# Patient Record
Sex: MALE | Race: WHITE | NOT HISPANIC OR LATINO | URBAN - METROPOLITAN AREA
[De-identification: names, ages, dates, MRNs, and addresses within clinical notes are randomized per-mention and may not be internally consistent; named-entity substitution may affect disease eponyms.]

---

## 2017-01-01 ENCOUNTER — OFFICE VISIT - RIVER FALLS (OUTPATIENT)
Dept: FAMILY MEDICINE | Facility: CLINIC | Age: 67
End: 2017-01-01

## 2017-01-01 ENCOUNTER — AMBULATORY - RIVER FALLS (OUTPATIENT)
Dept: FAMILY MEDICINE | Facility: CLINIC | Age: 67
End: 2017-01-01

## 2017-01-01 ENCOUNTER — COMMUNICATION - RIVER FALLS (OUTPATIENT)
Dept: FAMILY MEDICINE | Facility: CLINIC | Age: 67
End: 2017-01-01

## 2017-01-01 LAB
CREAT SERPL-MCNC: 0.94 MG/DL (ref 0.7–1.25)
GLUCOSE BLD-MCNC: 143 MG/DL (ref 65–99)
HBA1C MFR BLD: 7.5 %
HBA1C MFR BLD: 7.5 %

## 2017-01-01 ASSESSMENT — MIFFLIN-ST. JEOR
SCORE: 1516.36
SCORE: 1512.73

## 2017-03-08 ENCOUNTER — OFFICE VISIT - RIVER FALLS (OUTPATIENT)
Dept: FAMILY MEDICINE | Facility: CLINIC | Age: 67
End: 2017-03-08

## 2017-03-14 ENCOUNTER — OFFICE VISIT - RIVER FALLS (OUTPATIENT)
Dept: FAMILY MEDICINE | Facility: CLINIC | Age: 67
End: 2017-03-14

## 2018-01-01 ENCOUNTER — OFFICE VISIT - RIVER FALLS (OUTPATIENT)
Dept: FAMILY MEDICINE | Facility: CLINIC | Age: 68
End: 2018-01-01

## 2018-01-01 ENCOUNTER — AMBULATORY - RIVER FALLS (OUTPATIENT)
Dept: FAMILY MEDICINE | Facility: CLINIC | Age: 68
End: 2018-01-01

## 2018-01-01 LAB
CREAT SERPL-MCNC: 0.9 MG/DL
GLUCOSE BLD-MCNC: 198 MG/DL

## 2018-01-01 ASSESSMENT — MIFFLIN-ST. JEOR: SCORE: 1473.72

## 2022-02-11 VITALS
HEART RATE: 64 BPM | WEIGHT: 172.4 LBS | SYSTOLIC BLOOD PRESSURE: 120 MMHG | SYSTOLIC BLOOD PRESSURE: 150 MMHG | HEART RATE: 70 BPM | OXYGEN SATURATION: 99 % | DIASTOLIC BLOOD PRESSURE: 58 MMHG | BODY MASS INDEX: 27.49 KG/M2 | HEIGHT: 66 IN | TEMPERATURE: 98.3 F | TEMPERATURE: 97.7 F | DIASTOLIC BLOOD PRESSURE: 70 MMHG | BODY MASS INDEX: 27.71 KG/M2 | WEIGHT: 171.6 LBS

## 2022-02-11 VITALS
WEIGHT: 186.2 LBS | SYSTOLIC BLOOD PRESSURE: 128 MMHG | DIASTOLIC BLOOD PRESSURE: 68 MMHG | HEART RATE: 60 BPM | TEMPERATURE: 97.9 F | BODY MASS INDEX: 29.83 KG/M2

## 2022-02-11 VITALS
OXYGEN SATURATION: 98 % | RESPIRATION RATE: 20 BRPM | WEIGHT: 176 LBS | WEIGHT: 181.8 LBS | HEART RATE: 60 BPM | WEIGHT: 181 LBS | DIASTOLIC BLOOD PRESSURE: 60 MMHG | DIASTOLIC BLOOD PRESSURE: 66 MMHG | WEIGHT: 184 LBS | HEIGHT: 66 IN | OXYGEN SATURATION: 99 % | SYSTOLIC BLOOD PRESSURE: 138 MMHG | HEART RATE: 73 BPM | HEART RATE: 80 BPM | TEMPERATURE: 96.2 F | BODY MASS INDEX: 29.47 KG/M2 | HEART RATE: 68 BPM | BODY MASS INDEX: 28.19 KG/M2 | HEIGHT: 66 IN | OXYGEN SATURATION: 98 % | SYSTOLIC BLOOD PRESSURE: 131 MMHG | DIASTOLIC BLOOD PRESSURE: 60 MMHG | TEMPERATURE: 97.4 F | SYSTOLIC BLOOD PRESSURE: 130 MMHG | TEMPERATURE: 97.4 F | DIASTOLIC BLOOD PRESSURE: 75 MMHG | BODY MASS INDEX: 29.09 KG/M2 | BODY MASS INDEX: 29.22 KG/M2 | TEMPERATURE: 97.5 F | SYSTOLIC BLOOD PRESSURE: 112 MMHG

## 2022-02-11 VITALS
WEIGHT: 186 LBS | SYSTOLIC BLOOD PRESSURE: 126 MMHG | HEART RATE: 80 BPM | BODY MASS INDEX: 29.8 KG/M2 | TEMPERATURE: 96.2 F | DIASTOLIC BLOOD PRESSURE: 60 MMHG

## 2022-02-16 NOTE — CARE COORDINATION
Patient:   CHINO LEWIS            MRN: [id number]            FIN: [id number]               Age:   67 years     Sex:  Male     :  1950   Associated Diagnoses:   None   Author:   Gladys Canela CMA      Sources of Information:  [ ] Patient, family member, or caregiver (Please list):  [ X] Hospital discharge summary  [ ] Hospital fax  [ ] List of recent hospitalizations or ED visits  [ ] Other:   Discharged From: [facility]   Discharge Date: 2018  Diagnosis/Problem: Malignant pleuroal effusion   Discharge note reviewed: [ X] Yes [ ]No  Medication Changes: [X ] Yes [ ] No   Medication List Updated: [X ] Yes [ ] No  Needs Referral or Lab: [ ] Yes [X ] No  F/U Appointment Made With: I talked to Chino today.  He was to see Dr. Neal on Friday for follow up visit.  He has CHemo scheduled for Friday.    I will check with Dr. Neal to see if that follow up is needed. He is being followed closely by Dr. Dwyer and his care team at this time.    Patient contacted: Talked to Chino, He is going to  the pain medication but he has not really had much pain since he left the hospital.    Additional Information :   Dr. Neal is fine that Chino does not come in for his follow up after the ED visit.    But he will notifiy is if he see changes happening with his blood surgars and will keep DS in the loop as well.  Note sent to DS so she is aware.

## 2022-02-16 NOTE — PROGRESS NOTES
Patient:   JIMENEZ LEWIS            MRN: [id number]            FIN: [id number]               Age:   66 years     Sex:  Male     :  1950   Associated Diagnoses:   Hypertension; Hyperlipidemia; Diabetes Mellitus, Type 2; Coronary artery disease; AK (actinic keratosis)   Author:   Antonio Neal MD      Visit Information      Date of Service: 2017 08:43 am  Performing Location: [facility]  Encounter#: [id number]      Primary Care Provider (PCP):  Antonio Neal MD    NPI# [id number]      Referring Provider:  Antonio Neal MD    NPI# [id number]      Chief Complaint            Additional Information:No additional information recorded during visit.   Chief complaint and symptoms as noted above and confirmed with patient      History of Present Illness   3/11/2014: Jimenez presents to clinic to [facility] care, previously followed by [facility].  He has a long history of type 2 diabetes without microvascular complications.  He has been maintained on oral hypoglycemics, historically achieving good control.  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  Plans to retire at end of year - basal insulin maybe an option then.  H/o CAD, CABG in  00; no symptoms since.  Says that his gycemic control and weight are generally worse in the winter months - limited activities, increases with warmer weather.   Had been on Medifast, achieved some weight reduction, but couldn't tolerate soy-based products; no longer on diet.  No other voiced complaints.  Shared results of recent labs.    2016: Robbie presents to clinic for regular follow-up.  Since her last visit he started on glimepiride as a preferred option primarily related to cost.  Overall blood sugar control is in good.  A few isolated episodes of hypoglycemia in the evenings.  Typically morning sugars ranging from 100s-120s.  Currently taking Lantus 24 units nightly.  Planning to leave for [place] for a ten-day excursion in the  near future.  He is retired as of this month.    3/14/2017: Presents for general diabetic follow-up.  He switch from Victoza to Tanzeum this past fall.  He increase his Lantus up from 24 units to 28 units.  He is seeing considerable variability in blood sugars in the morning and finds that sugars consistently will rise throughout the day.  No hypoglycemic episodes.    6/14/2017: Check presents to clinic for diabetic follow-up.  At her last visit he was started on Jardiance 10 mg daily.  He has tolerated medication without problems.  No significant changes in weight or blood pressure.  No described hypoglycemia.  Hemoglobin A1c has improved since last visit.         Review of Systems   Constitutional:  No fever, No chills.    Eye:  Negative except as documented in history of present illness.    Ear/Nose/Mouth/Throat:  Negative except as documented in history of present illness.    Respiratory:  No shortness of breath.    Cardiovascular:  No chest pain, No palpitations, No peripheral edema, No syncope.    Gastrointestinal:  No nausea, No vomiting, No abdominal pain.    Genitourinary:  No dysuria, No hematuria.    Hematology/Lymphatics:  Negative except as documented in history of present illness.    Endocrine:  No excessive thirst, No polyuria.    Immunologic:  No recurrent fevers.    Musculoskeletal:  Joint pain, No muscle pain.    Neurologic:  Alert and oriented X4, No numbness, No tingling, No headache.       Health Status   Allergies:    Allergic Reactions (Selected)  No known allergies   Medications:  (Selected)   Prescriptions  Prescribed  BD PEN JOHN UF SHORT 8MM 100's 31G 5/16: BD PEN JOHN UF SHORT 8MM 100's 31G 5/16, See Instructions, Instructions: USE TWICE A DAY, Supply, # 200 EA, 2 Refill(s), Type: Maintenance, Pharmacy: [facility], USE TWICE A DAY  BD PEN NEEDLES UF SHORT 8MM 100'S 31G 5/16: BD PEN NEEDLES UF SHORT 8MM 100'S 31G 5/16, See Instructions, Instructions: USE DAILY, # 100 EA, 3  Refill(s), Type: Maintenance, Pharmacy: [facility]  BD PEN NEEDLES UF SHORT 8MM 100'S 31G 5/16: See Instructions, Instructions: USE DAILY, # 100 unknown unit, 2 Refill(s), Pharmacy: [facility], USE DAILY  FREESTYLE LITE TEST [id number]: See Instructions, Instructions: USE TO TEST FIVE TO SIX TIMES DAILY, # 500 EA, 2 Refill(s), Type: Soft Stop, Pharmacy: [facility], USE TO TEST FIVE TO SIX TIMES DAILY  Freestyle Lite test strips: Freestyle Lite test strips, See Instructions, Instructions: TESTING 5-6x/day, Supply, # 500 EA, 3 Refill(s), Pharmacy: [facility], TESTING 5-6x/day  Freestyle insulinx test strips: Freestyle insulinx test strips, See Instructions, Instructions: testing 2x/ day, Supply, # 200 EA, 3 Refill(s), Type: Maintenance, Pharmacy: [facility], testing 2x/ day  Jardiance 25 mg oral tablet: 1 tab(s) ( 25 mg ), po, qam, # 90 tab(s), 3 Refill(s), Type: Maintenance, Pharmacy: [facility], 1 tab(s) po qam  Lantus Solostar Pen 100 units/mL subcutaneous solution: See Instructions, Instructions: INJECT 34 UNITS UNDER THE SKIN AT BEDTIME, # 45 mL, 3 Refill(s), Type: Maintenance, Pharmacy: [facility], INJECT 34 UNITS UNDER THE SKIN AT BEDTIME  Lipitor 20 mg oral tablet: 1 tab(s) ( 20 mg ), PO, Daily, # 90 tab(s), 3 Refill(s), Type: Maintenance, Pharmacy: [facility], 1 tab(s) po daily  Misc Rx Supply: BD UF short 5/16 pen needles, See Instructions, Instructions: uses BID, Supply, # 200 EA, 3 Refill(s), Type: Maintenance, [facility]acy: [facility], in place of previious rx-keep on hold and pt will notify when needed, uses BID  Tanzeum 30 mg subcutaneous injection: ( 30 mg ), subcutaneous, qweek, # 12 EA, 3 Refill(s), Type: Maintenance, Pharmacy: [facility], 30 mg subcutaneous qweek  Viagra 100 mg oral tablet: 1 tab(s) ( 100 mg ), PO, Daily,  Instructions: KEEP ON HOLD AND PT WILL NOTIFY WHEN NEEDED, # 30 tab(s), 11 Refill(s), Type: Maintenance, Pharmacy: [facility], 1 tab(s) po daily,Instr:KEEP ON HOLD AND PT WILL NOTIFY WHEN NEEDED  aspirin 325 mg oral tablet: 1 tab(s) ( 325 mg ), PO, Daily, # 30 tab(s), 0 Refill(s), Type: Maintenance, OTC (Rx)  atenolol 25 mg oral tablet: 1 tab(s) ( 25 mg ), po, daily, # 90 tab(s), 3 Refill(s), Type: Maintenance, Pharmacy: [facility], 1 tab(s) po daily  hydrochlorothiazide-lisinopril 12.5 mg-10 mg oral tablet: 1 tab(s), PO, Daily, # 90 tab(s), 3 Refill(s), Type: Maintenance, Pharmacy: [facility], 1 tab(s) po daily  metFORMIN 1000 mg oral tablet: 1 tab(s) ( 1,000 mg ), po, bid, # 180 tab(s), 3 Refill(s), Type: Maintenance, Pharmacy: [facility], 1 tab(s) po bid  Documented Medications  Documented  Eucerin topical cream: 1 gurdeep, TOP, BID, 0  Multiple Vitamins oral capsule: 1 cap(s), PO, Daily, 0 Refill(s), Type: Soft Stop   Problem list:    All Problems  Coronary artery disease / ICD-9-.0 / Confirmed  Diabetes Mellitus, Type 2 / ICD-9-.00 / Confirmed  Hyperlipidemia / SNOMED CT 27974090 / Confirmed  Hypertension / SNOMED CT 1386690926 / Confirmed  Impotence / ICD-9-.84 / Confirmed  Obesity / ICD-9-.00 / Probable  Inactive: Diverticulosis / ICD-9-.10  Canceled: Obese / ICD-9-.00      Histories   Past Medical History:    Active  Coronary artery disease (414.0)   Family History:    Melanoma  Sister (Vilma)  Diabetes mellitus  Sister (Vilma)  Hypertension  Sister (Vilma)  Brother (Michael)  Brother (Bruno)  Mother ()  Diabetes mellitus type 2  Mother ()  Father ()  Myocardial infarction  Father ()  Carotid endarterectomy  Mother ()  Bladder cancer  Father ()     Procedure history:    Colonoscopy (990507546) on 2010 at 60 Years.  Comments:  2/15/2011 12:47 PM -  Svitlana Abdalla CMA  Repeat in 10 yrs    12/9/2010 10:23 AM - Juliet Rasmussen  Post-op diagnosis:  diverticulosis of colon.  Dilated retinal eye exam with interpretation by an ophthalmologist or optometrist documented and reviewed (DM) (2022F) on 2/26/2010 at 59 Years.  Flexible sigmoidoscopy (43040965) on 11/11/2007 at 57 Years.  Flexible sigmoidoscopy (15040898) on 11/11/2004 at 54 Years.  CABG - Coronary artery bypass graft ([id number]) on 11/13/2000 at 50 Years.  Comments:  5/14/2010 12:42 PM - Maranda Love  Three Vessel.  Arthroscopy of knee (318343228) in 1993 at 43 Years.  Comments:  5/14/2010 12:41 PM - Maranda Love  Right.  Inguinal herniorrhaphy ([id number]) in 1966 at 16 Years.  Comments:  9/27/2013 9:54 AM - Destiney Bravo  Right  Inguinal herniorrhaphy ([id number]) in 1955 at 5 Years.  Comments:  9/27/2013 9:54 AM - Destiney Bravo  Left   Social History:        Alcohol Assessment: Current            Current, Wine (5 oz), 1-2 times per week      Tobacco Assessment: Past            Past, Stopped age 50 Years.      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        Physical Examination   vital signs stable, as noted above   VS/Measurements   General:  Alert and oriented, No acute distress.    Eye:  Extraocular movements are intact.    HENT:  Normocephalic, Tympanic membranes are clear, Oral mucosa is moist, No pharyngeal erythema, upper dentures.    Neck:  Supple, No jugular venous distention, No lymphadenopathy.    Respiratory:  Lungs are clear to auscultation, Respirations are non-labored.    Cardiovascular:  Normal rate, No murmur, No edema.    Gastrointestinal:  Soft, Non-distended, Normal bowel sounds, No organomegaly.    Musculoskeletal:  Normal range of motion, Normal strength, No tenderness.    Integumentary:  dry, crackles soles of feet - no intertrigo, single  actinic keratosis on right cheek.    Neurologic:  Alert, Oriented, Normal motor function, No focal deficits.    Cognition and Speech:  Oriented, Speech clear and coherent.    Psychiatric:  Appropriate mood & affect.       Review / Management   Results review:  Lab results: 6/7/2017 12:00 AM CDT    Hgb A1c                   7.5  HI  .       Impression and Plan   Diagnosis     Hypertension (VKO54-IR I10).     Hyperlipidemia (GRG46-RV E78.5).     Diabetes Mellitus, Type 2 (KIM53-PJ E11.9).     Coronary artery disease (RKZ86-FK I25.10).     AK (actinic keratosis) (RUF98-HC L57.0).       .) DM, controlled  hypoglycemic medications: metformin 1g BID, tanzeum 30mg qweek, empagliflozin 10mg daily   - increase empagliflozin to 25mg daily  insulin therapy: Lantus 34 units qhs   - other alterative would be introduction of prandial insulin  last HbA1c: 7.5%  microvascular complications: none  macrovascular complications: CAD  ASA/JOSÉ MIGUEL/statin:  ASA 325mg daily, lisinopril 10mg, atorvastatin 20mg QHS    .) HTN, controlled  current antihypertensive regimen: atenolol 25mg daily, lisinopril/hctz 10/12.5mg daily  regimen changes: none  intolerance:  future titration/work-up plan:     .) CAD, h/o CABG in '00   - on atorvastatin, ASA, atenolol    .) Health maintenance   - CRC screening due in 2020   - PSA 0.4;  plans to monitor q2yrs ('17)   - AAA screen normal ('15)    RTC in 6 months         Professional Services   Counseling Summary:  This was a 25 minute visit with greater than 50% of that time spent counseling the patient.

## 2022-02-16 NOTE — PROGRESS NOTES
Patient:   JIMENEZ LEWIS            MRN: [id number]            FIN: [id number]               Age:   66 years     Sex:  Male     :  1950   Associated Diagnoses:   Hypertension; Hyperlipidemia; Diabetes Mellitus, Type 2; Coronary artery disease; AK (actinic keratosis)   Author:   Antonio Neal MD      Visit Information      Date of Service: 2017 08:27 am  Performing Location: [facility]  Encounter#: [id number]      Primary Care Provider (PCP):  Antonio Neal MD    NPI# [id number]      Referring Provider:  No referring provider recorded for selected visit.      Chief Complaint   3/14/2017 8:43 AM CDT    f/u DM - BS's elevated since switching from Victoza elly Tanzeum              Additional Information:No additional information recorded during visit.   Chief complaint and symptoms as noted above and confirmed with patient      History of Present Illness   3/11/2014: Jimenez presents to clinic to establish care, previously followed by [facility].  He has a long history of type 2 diabetes without microvascular complications.  He has been maintained on oral hypoglycemics, historically achieving good control.  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  Plans to retire at end of year - basal insulin maybe an option then.  H/o CAD, CABG in  00; no symptoms since.  Says that his gycemic control and weight are generally worse in the winter months - limited activities, increases with warmer weather.   Had been on Medifast, achieved some weight reduction, but couldn't tolerate soy-based products; no longer on diet.  No other voiced complaints.  Shared results of recent labs.    2016: Presents for regular diabetic follow-up.  No changes in his insulin or other medicines.  Labs reviewed with him today.  Hemoglobin A1c and Accu-Chek blood sugar readings have been ranging higher more recently.  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    2016: Robbie presents to clinic for  regular follow-up.  Since her last visit he started on glimepiride as a preferred option primarily related to cost.  Overall blood sugar control is in good.  A few isolated episodes of hypoglycemia in the evenings.  Typically morning sugars ranging from 100s-120s.  Currently taking Lantus 24 units nightly.  Planning to leave for [place] for a ten-day excursion in the near future.  He is retired as of this month.    3/14/2017: Presents for general diabetic follow-up.  He switch from Victoza to Tanzeum this past fall.  He increase his Lantus up from 24 units to 28 units.  He is seeing considerable variability in blood sugars in the morning and finds that sugars consistently will rise throughout the day.  No hypoglycemic episodes.         Review of Systems   Constitutional:  No fever, No chills.    Eye:  Negative except as documented in history of present illness.    Ear/Nose/Mouth/Throat:  Negative except as documented in history of present illness.    Respiratory:  No shortness of breath.    Cardiovascular:  No chest pain, No palpitations, No peripheral edema, No syncope.    Gastrointestinal:  No nausea, No vomiting, No abdominal pain.    Genitourinary:  No dysuria, No hematuria.    Hematology/Lymphatics:  Negative except as documented in history of present illness.    Endocrine:  No excessive thirst, No polyuria.    Immunologic:  No recurrent fevers.    Musculoskeletal:  Joint pain, No muscle pain.    Neurologic:  Alert and oriented X4, No numbness, No tingling, No headache.       Health Status   Allergies:    Allergic Reactions (Selected)  No known allergies   Medications:  (Selected)   Prescriptions  Prescribed  BD PEN JOHN UF SHORT 8MM 100's 31G 5/16: BD PEN JOHN UF SHORT 8MM 100's 31G 5/16, See Instructions, Instructions: USE TWICE A DAY, Supply, # 200 EA, 2 Refill(s), Type: Maintenance, Pharmacy: [facility], USE TWICE A DAY  BD PEN NEEDLES UF SHORT 8MM 100'S 31G 5/16: BD PEN NEEDLES UF  SHORT 8MM 100'S 31G 5/16, See Instructions, Instructions: USE DAILY, # 100 EA, 3 Refill(s), Type: Maintenance, Pharmacy: [facility]  BD PEN NEEDLES UF SHORT 8MM 100'S 31G 5/16: See Instructions, Instructions: USE DAILY, # 100 unknown unit, 2 Refill(s), Pharmacy: [facility], USE DAILY  FREESTYLE LITE TEST UVFX10W: See Instructions, Instructions: USE TO TEST FIVE TO SIX TIMES DAILY, # 500 EA, 2 Refill(s), Type: Soft Stop, Pharmacy: [email or web address], USE TO TEST FIVE TO SIX TIMES DAILY  Freestyle Lite test strips: Freestyle Lite test strips, See Instructions, Instructions: TESTING 5-6x/day, Supply, # 500 EA, 3 Refill(s), Pharmacy: [facility], TESTING 5-6x/day  Freestyle insulinx test strips: Freestyle insulinx test strips, See Instructions, Instructions: testing 2x/ day, Supply, # 200 EA, 3 Refill(s), Type: Maintenance, [facility]: [facility], testing 2x/ day  Jardiance 10 mg oral tablet: 1 tab(s) ( 10 mg ), po, qam, # 30 tab(s), 3 Refill(s), Type: Maintenance, Pharmacy: [facility], 1 tab(s) po qam  Lantus Solostar Pen 100 units/mL subcutaneous solution: See Instructions, Instructions: INJECT 22 UNITS UNDER THE SKIN AT BEDTIME, # 30 mL, 3 Refill(s), Type: Maintenance, Pharmacy: [facility], INJECT 22 UNITS UNDER THE SKIN AT BEDTIME  Lipitor 20 mg oral tablet: 1 tab(s) ( 20 mg ), PO, Daily, # 90 tab(s), 1 Refill(s), Type: Maintenance, Pharmacy: [facility], 1 tab(s) po daily  Misc Rx Supply: BD UF short 5/16 pen needles, See Instructions, Instructions: uses BID, Supply, # 200 EA, 3 Refill(s), Type: Maintenance, Pharmacy: [facility], in place of previious rx-keep on hold and pt will notify when needed, uses BID  Tanzeum 30 mg subcutaneous injection: ( 30 mg ), subcutaneous, qweek, # 12 EA, 1 Refill(s), Type: Maintenance, Pharmacy: [facility], in place of  Victoza, 30 mg subcutaneous qweek  Viagra 100 mg oral tablet: 1 tab(s) ( 100 mg ), PO, Daily, Instructions: KEEP ON HOLD AND PT WILL NOTIFY WHEN NEEDED, # 30 tab(s), 11 Refill(s), Type: Maintenance, Pharmacy: [facility], 1 tab(s) po daily,Instr:KEEP ON HOLD AND PT WILL NOTIFY WHEN NEEDED  Victoza 18 mg/3 mL subcutaneous solution: See Instructions, Instructions: INJECT 1.8 MG UNDER THE SKIN DAILY, # 27 mL, 1 Refill(s), Type: Maintenance, Pharmacy: [facility], INJECT 1.8 MG UNDER THE SKIN DAILY  aspirin 325 mg oral tablet: 1 tab(s) ( 325 mg ), PO, Daily, # 30 tab(s), 0 Refill(s), Type: Maintenance, OTC (Rx)  atenolol 25 mg oral tablet: 1 tab(s) ( 25 mg ), po, daily, # 90 tab(s), 1 Refill(s), Type: Maintenance, Pharmacy: [facility], 1 tab(s) po daily  glimepiride 2 mg oral tablet: 1 tab(s) ( 2 mg ), po, daily, # 90 tab(s), 1 Refill(s), Type: Maintenance, Pharmacy: [facility], 1 tab(s) po daily  hydrochlorothiazide-lisinopril 12.5 mg-10 mg oral tablet: 1 tab(s), PO, Daily, # 90 tab(s), 3 Refill(s), Type: Maintenance, Pharmacy: [facility], KEEP ON HOLD AND PT WILL NOTIFY WHEN NEEDED, 1 tab(s) po daily  metFORMIN 1000 mg oral tablet: 1 tab(s) ( 1,000 mg ), po, bid, # 180 tab(s), 1 Refill(s), Type: Maintenance, Pharmacy: [facility], 1 tab(s) po bid  Documented Medications  Documented  Eucerin topical cream: 1 gurdeep, TOP, BID, 0  Multiple Vitamins oral capsule: 1 cap(s), PO, Daily, 0 Refill(s), Type: Soft Stop   Problem list:    All Problems  Coronary artery disease / ICD-9-.0 / Confirmed  Diabetes Mellitus, Type 2 / ICD-9-.00 / Confirmed  Hyperlipidemia / SNOMED CT 51530193 / Confirmed  Hypertension / SNOMED CT 7738344748 / Confirmed  Impotence / ICD-9-.84 / Confirmed  Obesity / ICD-9-.00 / Probable  Inactive: Diverticulosis / ICD-9-.10  Canceled: Obese / ICD-9-.00       Histories   Past Medical History:    Active  Coronary artery disease (414.0)   Family History:    Melanoma  Sister (Vilma)  Diabetes mellitus  Sister (Vilma)  Hypertension  Sister (Vilma)  Brother (Michael)  Brother (Bruno)  Mother ()  Diabetes mellitus type 2  Mother ()  Father ()  Myocardial infarction  Father ()  Carotid endarterectomy  Mother ()  Bladder cancer  Father ()     Procedure history:    Colonoscopy (537254298) on 2010 at 60 Years.  Comments:  2/15/2011 12:47 PM - Svitlana Abdalla CMA  Repeat in 10 yrs    2010 10:23 AM - Juliet Rasmussen  Post-op diagnosis:  diverticulosis of colon.  Dilated retinal eye exam with interpretation by an ophthalmologist or optometrist documented and reviewed (DM) () on 2010 at 59 Years.  Flexible sigmoidoscopy (59267839) on 2007 at 57 Years.  Flexible sigmoidoscopy (45330972) on 2004 at 54 Years.  CABG - Coronary artery bypass graft (259653077) on 2000 at 50 Years.  Comments:  2010 12:42 PM - Maranda Love  Three Vessel.  Arthroscopy of knee (354524030) in  at 43 Years.  Comments:  2010 12:41 PM - Maranda Love  Right.  Inguinal herniorrhaphy ([id number]) in 1966 at 16 Years.  Comments:  2013 9:54 AM - Destiney Bravo  Right  Inguinal herniorrhaphy ([id number]) in 1955 at 5 Years.  Comments:  2013 9:54 AM - Destiney Bravo  Left   Social History:        Alcohol Assessment: Current            Current, Wine (5 oz), 1-2 times per week      Tobacco Assessment: Past            Past, Stopped age 50 Years.      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        Physical Examination   vital signs stable, as noted above   Vital Signs   3/14/2017 8:43 AM CDT Temperature Tympanic 97.9 DegF    Peripheral Pulse Rate 60 bpm    Systolic Blood Pressure  128 mmHg    Diastolic Blood Pressure 68 mmHg    Mean Arterial Pressure 88 mmHg    BP Site Right arm      Measurements from flowsheet : Measurements   3/14/2017 8:43 AM CDT    Weight Measured - Standard                186.2 lb     General:  Alert and oriented, No acute distress.    Eye:  Extraocular movements are intact.    HENT:  Normocephalic, Tympanic membranes are clear, Oral mucosa is moist, No pharyngeal erythema, upper dentures.    Neck:  Supple, No jugular venous distention, No lymphadenopathy.    Respiratory:  Lungs are clear to auscultation, Respirations are non-labored.    Cardiovascular:  Normal rate, No murmur, No edema.    Gastrointestinal:  Soft, Non-distended, Normal bowel sounds, No organomegaly.    Musculoskeletal:  Normal range of motion, Normal strength, No tenderness.    Integumentary:  dry, crackles soles of feet - no intertrigo, single actinic keratosis on right cheek.    Neurologic:  Alert, Oriented, Normal motor function, No focal deficits, Normal monofilament testing.    Cognition and Speech:  Oriented, Speech clear and coherent.    Psychiatric:  Appropriate mood & affect.       Review / Management   Results review:  Lab results   3/8/2017 8:16 AM CST Hgb A1c 8.5  HI    Cholesterol 143 mg/dL    Non-    HDL 32 mg/dL  LOW    Chol/HDL Ratio 4.5    LDL 73    Triglyceride 188 mg/dL  HI    U Microalbumin 1.8 mg/dL    Ur Creatinine 192 mg/dL    Ur Microalb/Creat Ratio 9   .       Impression and Plan   Diagnosis     Hypertension (SRG22-GR I10).     Hyperlipidemia (HZB63-VT E78.5).     Diabetes Mellitus, Type 2 (LFC41-EI E11.9).     Coronary artery disease (QHO61-RN I25.10).     AK (actinic keratosis) (GUY34-TE L57.0).       .) DM, uncontrolled  hypoglycemic medications: metformin 1g BID, tanzeum 30mg qweek, glimepiride 2mg daily  insulin therapy: Lantus 28 units qhs   - will try and start on empagliflozin 10mg daily (potentially may stop both glimepiride and hctz if good beneficial effect)     - cautioned about hypuglycemia and hypovolemia risk   - other alterative would be introduction of prandial insulin  last HbA1c: 8.5%  microvascular complications: none  macrovascular complications: CAD  ASA/JOSÉ MIGUEL/statin:  ASA 325mg daily, lisinopril 10mg, atorvastatin 20mg QHS    .) HTN, controlled  current antihypertensive regimen: atenolol 25mg daily, lisinopril/hctz 10/12.5mg daily  regimen changes: none  intolerance:  future titration/work-up plan:     .) CAD, h/o CABG in '00   - on atorvastatin, ASA, atenolol    .) Health maintenance   - CRC screening due in 2020   - PSA 0.4;  plans to monitor q2yrs ('17)   - AAA screen normal ('15)    RTC in 4 months         Professional Services   Counseling Summary:  This was a 25 minute visit with greater than 50% of that time spent counseling the patient.

## 2022-02-16 NOTE — PROGRESS NOTES
Patient:   CHINO LEWIS            MRN: [id number]            FIN: [id number]               Age:   67 years     Sex:  Male     :  1950   Associated Diagnoses:   Hypertension; Hyperlipidemia; Diabetes Mellitus, Type 2; Coronary artery disease; Non-small cell lung cancer (NSCLC)   Author:   Antonio Neal MD      Visit Information      Date of Service: 2018 10:56 am  Performing Location: [facility]  Encounter#: [id number]      Primary Care Provider (PCP):  Antonio Neal MD    NPI# [id number]      Referring Provider:  Antonio Neal MD    NPI# [id number]      Chief Complaint            Additional Information:No additional information recorded during visit.   Chief complaint and symptoms as noted above and confirmed with patient      History of Present Illness   3/11/2014: Chino presents to clinic to [facility] care, previously followed by [facility].  He has a long history of type 2 diabetes without microvascular complications.  He has been maintained on oral hypoglycemics, historically achieving good control.  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  Plans to retire at end of year - basal insulin maybe an option then.  H/o CAD, CABG in  00; no symptoms since.  Says that his gycemic control and weight are generally worse in the winter months - limited activities, increases with warmer weather.   Had been on Medifast, achieved some weight reduction, but couldn't tolerate soy-based products; no longer on diet.  No other voiced complaints.  Shared results of recent labs.    2018: Presents for hospital follow-up.  This past weekend presented to the emergency room with a reported temperature 102.  Admitted to [facility] for suspected severe sepsis.  Started on broad-spectrum antibiotics.  Blood cultures were normal.  Left sided malignant effusion re-tapped; pleural fluid not showing any growth.  Discharged on oral Levaquin, though check did not take due to his concerns  about antibiotic exposure during his hospitalization.  He is scheduled for Pleurx catheter placement this coming Monday.  Generally feels better since hospital discharge.  He says that the thoracentesis seemed to help most.  He is continued on his regular chemotherapy cycle.  Continues to have complications related to hyperglycemia around chemo time.  Sodium level of 122 upon hospital admission which did trend upward.  Repeat level today is 131.         Review of Systems   Constitutional:  No fever, No chills.    Eye:  Negative except as documented in history of present illness.    Ear/Nose/Mouth/Throat:  Negative except as documented in history of present illness.    Respiratory:  Shortness of breath, No cough, No sputum production, No hemoptysis.    Cardiovascular:  No chest pain, No palpitations, No peripheral edema, No syncope.    Gastrointestinal:  No nausea, No vomiting, No abdominal pain.    Genitourinary:  No dysuria, No hematuria.    Hematology/Lymphatics:  Negative except as documented in history of present illness.    Endocrine:  No excessive thirst, No polyuria.    Immunologic:  No recurrent fevers.    Musculoskeletal:  No back pain, No joint pain, No muscle pain.    Neurologic:  Alert and oriented X4, No numbness, No tingling, No headache.       Health Status   Allergies:    Allergic Reactions (Selected)  No Known Medication Allergies   Medications:  (Selected)   Prescriptions  Prescribed  BD PEN NEEDLES UF SHORT 8MM 100'S 31G 5/16: BD PEN NEEDLES UF SHORT 8MM 100'S 31G 5/16, See Instructions, Instructions: USE DAILY, # 100 EA, 3 Refill(s), Type: Maintenance, Pharmacy: [facility]  BD PEN NEEDLES UF SHORT 8MM 100'S 31G 5/16: See Instructions, Instructions: USE DAILY, # 100 unknown unit, 2 Refill(s), [facility]: [facility], USE DAILY  BP Pen Henry UF Short 8mm 100's 31G5/16: BP Pen Henry UF Short 8mm 100's 31G5/16, See Instructions, Instructions: use bid, Supply, # 200 EA, 3 Refill(s),  Type: Maintenance, Pharmacy: [facility], use bid  FREESTYLE LITE TEST [id number]: FREESTYLE LITE TEST RCSR18S, See Instructions, Instructions: USE TO TEST FIVE TO SIX TIMES DAILY, Supply, # 500 EA, 2 Refill(s), Type: Soft Stop, Pharmacy: [id number], USE TO TEST FIVE TO SIX TIMES DAILY  Freestyle Lite test strips: Freestyle Lite test strips, See Instructions, Instructions: TESTING 5-6x/day, Supply, # 500 EA, 3 Refill(s), [facility]: [facility], TESTING 5-6x/day  Freestyle insulinx test strips: Freestyle insulinx test strips, See Instructions, Instructions: testing 2x/ day, Supply, # 200 EA, 3 Refill(s), Type: Maintenance, Pharmacy: [facility], testing 2x/ day  Lantus Solostar Pen 100 units/mL subcutaneous solution: See Instructions, Instructions: INJECT 34 UNITS UNDER THE SKIN AT BEDTIME, # 45 mL, 3 Refill(s), Type: Maintenance, Pharmacy: [facility], INJECT 34 UNITS UNDER THE SKIN AT BEDTIME  Lipitor 20 mg oral tablet: 1 tab(s) ( 20 mg ), PO, Daily, # 90 tab(s), 3 Refill(s), Type: Maintenance, Pharmacy: [facility], 1 tab(s) po daily  Misc Rx Supply: BD UF short 5/16 pen needles, See Instructions, Instructions: uses BID, Supply, # 200 EA, 3 Refill(s), Type: Maintenance, Pharmacy: [facility], in place of previious rx-keep on hold and pt will notify when needed, uses BID  Tanzeum 30 mg subcutaneous injection: ( 30 mg ), subcutaneous, qweek, # 12 EA, 3 Refill(s), Type: Maintenance, Pharmacy: [facility], 30 mg subcutaneous qweek  Viagra 100 mg oral tablet: 1 tab(s) ( 100 mg ), PO, Daily, Instructions: KEEP ON HOLD AND PT WILL NOTIFY WHEN NEEDED, # 30 tab(s), 11 Refill(s), Type: Maintenance, Pharmacy: [facility], 1 tab(s) po daily,Instr:KEEP ON HOLD AND PT WILL NOTIFY WHEN NEEDED  aspirin 325 mg oral tablet: 1 tab(s) ( 325 mg ), PO, Daily, # 30 tab(s), 0 Refill(s),  Type: Maintenance, OTC (Rx)  atenolol 25 mg oral tablet: 1 tab(s) ( 25 mg ), po, daily, # 90 tab(s), 3 Refill(s), Type: Maintenance, Pharmacy: [facility], 1 tab(s) po daily  lisinopril 10 mg oral tablet: 1 tab(s) ( 10 mg ), PO, Daily, # 90 tab(s), 3 Refill(s), Type: Maintenance, Pharmacy: [facility], 1 tab(s) po daily  metFORMIN 1000 mg oral tablet: 1 tab(s) ( 1,000 mg ), po, bid, # 180 tab(s), 3 Refill(s), Type: Maintenance, Pharmacy: [facility], 1 tab(s) po bid  Suspended  Jardiance 25 mg oral tablet: 1 tab(s) ( 25 mg ), po, qam, # 90 tab(s), 3 Refill(s), Type: Maintenance, Pharmacy: [facility], 1 tab(s) po qam  Documented Medications  Documented  Eucerin topical cream: 1 gurdeep, TOP, BID, 0  Flonase 50 mcg/inh nasal spray: 1 spray(s), nasal, daily, 0 Refill(s), Type: Maintenance  LORazepam 0.5 mg oral tablet: 1 tab(s) ( 0.5 mg ), po, hs, 0 Refill(s), Type: Maintenance  Mucinex 600 mg oral tablet, extended release: 1 tab(s) ( 600 mg ), po, q12 hrs, 0 Refill(s), Type: Maintenance  Multiple Vitamins oral capsule: 1 cap(s), PO, Daily, 0 Refill(s), Type: Soft Stop  OLANZapine 2.5 mg oral tablet: See Instructions, Instructions: 1 tab daily for 4dasy starting the day of chemo, 0 Refill(s), Type: Maintenance  folic acid 1 mg oral tablet: 1 tab(s) ( 1 mg ), po, daily, 0 Refill(s), Type: Maintenance  omeprazole 20 mg oral delayed release tablet: 1 tab(s) ( 20 mg ), po, daily, 0 Refill(s), Type: Maintenance  prochlorperazine 10 mg oral tablet: 1 tab(s) ( 10 mg ), po, q4 hrs, PRN: for nausea/vomiting, 0 Refill(s), Type: Maintenance   Problem list:    All Problems  Coronary artery disease / SNOMED CT 97833397 / Confirmed  Hyperlipidemia / SNOMED CT 78025761 / Confirmed  Hypertension / SNOMED CT 9457727813 / Confirmed  Impotence / SNOMED CT 1070313354 / Confirmed  Non-small cell carcinoma of lung / SNOMED CT 369737719 / Confirmed  Obesity / SNOMED CT  4240562629 / Probable  Diabetes mellitus, type 2 / SNOMED CT 842196276 / Confirmed  Inactive: Diverticulosis / SNOMED CT 5400313982  Resolved: Inpatient stay / SNOMED CT 085765257  Canceled: Obese / ICD-9-.00      Histories   Past Medical History:    Active  Diabetes mellitus, type 2 (936945182)  Impotence (0071753677)  Obesity (9186626221)  Coronary artery disease (49700581)  Non-small cell carcinoma of lung (511677952)  Resolved  Inpatient stay (747880921): Onset on 2018 at 67 years.  Resolved on 2018 at 67 years.  Comments:  2018 CST 7:39 AM CST - Dominique Goldman  @[facility] [place] [place] - Malignant pleural effusion   Family History:    Melanoma  Sister (Vilma)  Diabetes mellitus  Sister (Vilma)  Hypertension  Sister (Vilma)  Brother (Michael)  Brother (Bruno)  Mother ()  Diabetes mellitus type 2  Mother ()  Father ()  Myocardial infarction  Father ()  Carotid endarterectomy  Mother ()  Bladder cancer  Father ()     Procedure history:    Thoracentesis (623984931) on 2018 at 67 Years.  Comments:  2018 7:43 AM - Dominique Goldman  1200 ml.   DX:  Malignant pleural effusion.  Adenocarcinoma of right lung.  Diagnostic bronchoscopy (488661464) on 2017 at 67 Years.  Colonoscopy (466482219) on 2010 at 60 Years.  Comments:  2/15/2011 12:47 PM - Svitlana Abdalla CMA  Repeat in 10 yrs    2010 10:23 AM - Juliet Rasmussen  Post-op diagnosis:  diverticulosis of colon.  Dilated retinal eye exam with interpretation by an ophthalmologist or optometrist documented and reviewed (DM) () on 2010 at 59 Years.  Flexible sigmoidoscopy (14363414) on 2007 at 57 Years.  Flexible sigmoidoscopy (84809722) on 2004 at 54 Years.  CABG - Coronary artery bypass graft (301047332) on 2000 at 50 Years.  Comments:  2010 12:42 PM - Maranda Love  Three Vessel.  Arthroscopy of knee (669365165) in  at 43  Years.  Comments:  5/14/2010 12:41 PM - Maranda Love  Right.  Inguinal herniorrhaphy (85470757) in 1966 at 16 Years.  Comments:  9/27/2013 9:54 AM - Destiney Bravo  Right  Inguinal herniorrhaphy (09288909) in 1955 at 5 Years.  Comments:  9/27/2013 9:54 AM - Destiney Bravo  Left   Social History:        Alcohol Assessment            Current, Wine (5 oz), 2-3 times per week      Tobacco Assessment            Past, Stopped age 50 Years.      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        Physical Examination   vital signs stable, as noted above   VS/Measurements   General:  Alert and oriented, No acute distress.    Eye:  Extraocular movements are intact.    HENT:  Normocephalic, Tympanic membranes are clear, upper dentures.    Neck:  Supple.    Respiratory:  Respirations are non-labored, diminished breath sounds on right; dullness to palpation on right.    Cardiovascular:  Normal rate, Regular rhythm, No murmur, No edema, Infusaport in place.    Gastrointestinal:  Soft, Non-distended, Normal bowel sounds, No organomegaly.    Musculoskeletal:  Normal range of motion, Normal strength.    Integumentary:  single actinic keratosis on right cheek.    Neurologic:  Alert, Oriented, Normal motor function, No focal deficits.    Cognition and Speech:  Oriented, Speech clear and coherent.    Psychiatric:  Appropriate mood & affect.       Review / Management   Results review:  Lab results   2/16/2018 10:53 AM CST Sodium Level 131 mmol/L    Potassium Level 4.6 mmol/L    Chloride Level 98 mmol/L    CO2 Level 24 mmol/L    AGAP 9    Glucose Level 244 mg/dL    BUN 22 mg/dL    Creatinine 0.91 mg/dL    BUN/Creat Ratio 24    eGFR  >60    eGFR Non-African American >60    Calcium Level 8.9 mg/dL   2/12/2018 2:14 PM CST Sodium Level  mEq/L    Potassium Level TR 4.2 mEq/L    Chloride  mEq/L    CO2 TR 21 mEq/L    Anion Gap TR 7  mEq/L    Glucose Level  mg/dL    BUN TR 17 mg/dL    Creatinine TR 0.90 mg/dL    BUN/Creatinine Ratio TR 19    Calcium TR 8.9 mEq/dL    WBC TR 5.7 x10^3/uL    RBC TR 3.02 x10^6/uL    Hgb TR 7.6 g/dL    Hct TR 24.5 %    MCV TR 81 fL    MCH TR 25.2 pg    MCHC TR 31.0 gm/dL    RDW TR 20.7 %    Platelet  x10^3/uL    MPV (Mean Platelet Volume) TR 8.6 fL   .       Impression and Plan   Diagnosis     Hypertension (CFA02-JL I10).     Hyperlipidemia (OUA49-UV E78.5).     Diabetes Mellitus, Type 2 (ZEI40-LK E11.9).     Coronary artery disease (XYN72-DT I25.10).     Non-small cell lung cancer (NSCLC) (BID65-NO C34.90).         .) hospital f/u; sepsis, recurrent right pleural effusion  discharge summary reviewed and meds reconciled   - completed 3 day course of pip/tazo and vanco; discharged on levaquin though Robbie opted not to take.  Has felt well since.  At this point, will not resume  - scheduled for Pleur-X catheter placement on 2/19.    .) metastatic poorly differentiated adenocarcinoma of the lung; PDL1 +  - originally seen on CXR (10/31/2017)  - CT chest  (11/7/2017) redemonstrating pleural effusion with passive compressive atelectasis; no other described parenchymal disease  - diagnostic/therapeutic thoracentesis (11/9/2017): grossly hemorrhagic quality; exudative effusion (serum , total protein 6.1); negative cytology    - AFB stain negative; fluid culture negative  - repeat CT of chest (11/22/2017): recurrence of effusion with described 4x5 cm RUL posterior mass/consolidation  - PET demonstrating FDG avid lung mass and separate left femoral bone lesion  - biopsy positive for 50% PDL1 mutation  - started on Keytruda, Alimta, carbplatin + Zometa (completed 3 of planned 6 cycles)   - overall tolerating therapy well    .) DM, controlled  hypoglycemic medications: metformin 1g BID, tanzeum 30mg qweek, empagliflozin 25mg daily (holding)  insulin therapy: Lantus 20 units qhs + sliding scale insulin just  around chemo infusion  last HbA1c: 7.5%  microvascular complications: none  macrovascular complications: CAD  ASA/JOSÉ MIGUEL/statin:  ASA 325mg daily, lisinopril 10mg, atorvastatin 20mg QHS    plan as previously outlined:     .) HTN, controlled  current antihypertensive regimen: atenolol 25mg daily, lisinopril 10mg daily  regimen changes: none  intolerance: thiazide (hyponatremia)  future titration/work-up plan:     .) CAD, h/o CABG in '00   - on atorvastatin, ASA, atenolol    .) Health maintenance   - CRC screening due in 2020   - PSA 0.4;  plans to monitor q2yrs (recheck at next visit)   - AAA screen normal ('15)   - now retired    RTC as previously scheduled         Professional Services   Counseling Summary:  This was a 25 minute visit with greater than 50% of that time spent counseling the patient.

## 2022-02-16 NOTE — PROGRESS NOTES
Patient:   CHINO LEWIS            MRN: [id number]            FIN: [id number]               Age:   67 years     Sex:  Male     :  1950   Associated Diagnoses:   Hypertension; Hyperlipidemia; Diabetes Mellitus, Type 2; Coronary artery disease; AK (actinic keratosis)   Author:   Antonio Neal MD      Visit Information      Date of Service: 2017 11:21 am  Performing Location: [facility]  Encounter#: [id number]      Primary Care Provider (PCP):  Antonio Neal MD    NPI# [id number]      Referring Provider:  Antonio Neal MD    NPI# [id number]      Chief Complaint   2017 11:26 AM CST  f/u CT results and recent cytology results              Additional Information:No additional information recorded during visit.   Chief complaint and symptoms as noted above and confirmed with patient      History of Present Illness   3/11/2014: Chino presents to clinic to establish care, previously followed by [facility].  He has a long history of type 2 diabetes without microvascular complications.  He has been maintained on oral hypoglycemics, historically achieving good control.  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  Plans to retire at end of year - basal insulin maybe an option then.  H/o CAD, CABG in  00; no symptoms since.  Says that his gycemic control and weight are generally worse in the winter months - limited activities, increases with warmer weather.   Had been on Medifast, achieved some weight reduction, but couldn't tolerate soy-based products; no longer on diet.  No other voiced complaints.  Shared results of recent labs.    3/14/2017: Presents for general diabetic follow-up.  He switch from Victoza to Tanzeum this past fall.  He increase his Lantus up from 24 units to 28 units.  He is seeing considerable variability in blood sugars in the morning and finds that sugars consistently will rise throughout the day.  No hypoglycemic episodes.    2017: Check  presents to clinic for diabetic follow-up.  At her last visit he was started on Jardiance 10 mg daily.  He has tolerated medication without problems.  No significant changes in weight or blood pressure.  No described hypoglycemia.  Hemoglobin A1c has improved since last visit.    10/19/2017: Presents for diabetic follow-up.  He did have his Jardiance increased to 25 mg daily.  He has noticed some lower morning sugars in the 70s and 80s.  He has scaled back his Lantus dose from 34 units to 24 units at night.  Otherwise feeling fine.  No other specific complaints or concerns.    10/31/2017: Presents with complaints of midthoracic back pain aggravated after persistent coughing illness.  Pains are within the paraspinal distribution along the right side.  No pleuritic component can take a deep breath without pain.      11/7/2017: Return to clinic for follow-up after visit this past week.  He says he generally feels better from last week.  No back pains.  No shortness of breath.  Still some occasional semi-productive cough.  No positional orthopnea.  No general fluid retention.  No fever chills.      11/28/2017: Robbie returns to clinic after undergoing recent two-week follow-up CT of the previously drained right-sided pleural effusion.  Pleural studies consistent with an exudative effusion.  Grossly having a partial hemorrhagic quality.  Cytology negative.  He has noticed worsening positional dyspnea especially when lying down on the left side.  Describes some cough with blood-tinged component at times.  Denies any fever chills.  Follow-up CT on November 22 shows a general recurrence of his effusion though not to degree of previous imaging.  Now description of a right upper lobe posterior 4 x 4 centimeter consolidation.         Review of Systems   Constitutional:  No fever, No chills.    Eye:  Negative except as documented in history of present illness.    Ear/Nose/Mouth/Throat:  Negative except as documented in history  of present illness.    Respiratory:  Cough, Sputum production, Hemoptysis, No shortness of breath.    Cardiovascular:  No chest pain, No palpitations, No peripheral edema, No syncope.    Gastrointestinal:  No nausea, No vomiting, No abdominal pain.    Genitourinary:  No dysuria, No hematuria.    Hematology/Lymphatics:  Negative except as documented in history of present illness.    Endocrine:  No excessive thirst, No polyuria.    Immunologic:  No recurrent fevers.    Musculoskeletal:  No back pain, No joint pain, No muscle pain.    Neurologic:  Alert and oriented X4, No numbness, No tingling, No headache.       Health Status   Allergies:    Allergic Reactions (Selected)  No Known Medication Allergies   Medications:  (Selected)   Prescriptions  Prescribed  BD PEN NEEDLES UF SHORT 8MM 100'S 31G 5/16: BD PEN NEEDLES UF SHORT 8MM 100'S 31G 5/16, See Instructions, Instructions: USE DAILY, # 100 EA, 3 Refill(s), Type: Maintenance, Pharmacy: [facility]  BD PEN NEEDLES UF SHORT 8MM 100'S 31G 5/16: See Instructions, Instructions: USE DAILY, # 100 unknown unit, 2 Refill(s), Pharmacy: [facility], USE DAILY  BP Pen Henry UF Short 8mm 100's 31G5/16: BP Pen Henry UF Short 8mm 100's 31G5/16, See Instructions, Instructions: use bid, Supply, # 200 EA, 3 Refill(s), Type: Maintenance, Pharmacy: [facility], use bid  FREESTYLE LITE TEST [id number]: FREESTYLE LITE TEST [id number], See Instructions, Instructions: USE TO TEST FIVE TO SIX TIMES DAILY, Supply, # 500 EA, 2 Refill(s), Type: Soft Stop, Pharmacy: [id number], USE TO TEST FIVE TO SIX TIMES DAILY  Freestyle Lite test strips: Freestyle Lite test strips, See Instructions, Instructions: TESTING 5-6x/day, Supply, # 500 EA, 3 Refill(s), Pharmacy: [facility], TESTING 5-6x/day  Freestyle insulinx test strips: Freestyle insulinx test strips, See Instructions, Instructions: testing 2x/ day, Supply, # 200 EA, 3 Refill(s), Type:  Maintenance, Pharmacy: [facility], testing 2x/ day  Jardiance 25 mg oral tablet: 1 tab(s) ( 25 mg ), po, qam, # 90 tab(s), 3 Refill(s), Type: Maintenance, Pharmacy: [facility], 1 tab(s) po qam  Lantus Solostar Pen 100 units/mL subcutaneous solution: See Instructions, Instructions: INJECT 34 UNITS UNDER THE SKIN AT BEDTIME, # 45 mL, 3 Refill(s), Type: Maintenance, Pharmacy: [facility], INJECT 34 UNITS UNDER THE SKIN AT BEDTIME  Lipitor 20 mg oral tablet: 1 tab(s) ( 20 mg ), PO, Daily, # 90 tab(s), 3 Refill(s), Type: Maintenance, Pharmacy: [facility], 1 tab(s) po daily  Misc Rx Supply: BD UF short 5/16 pen needles, See Instructions, Instructions: uses BID, Supply, # 200 EA, 3 Refill(s), Type: Maintenance, Pharmacy: [facility], in place of previious rx-keep on hold and pt will notify when needed, uses BID  Tanzeum 30 mg subcutaneous injection: ( 30 mg ), subcutaneous, qweek, # 12 EA, 3 Refill(s), Type: Maintenance, Pharmacy: [facility], 30 mg subcutaneous qweek  Viagra 100 mg oral tablet: 1 tab(s) ( 100 mg ), PO, Daily, Instructions: KEEP ON HOLD AND PT WILL NOTIFY WHEN NEEDED, # 30 tab(s), 11 Refill(s), Type: Maintenance, Pharmacy: [facility], 1 tab(s) po daily,Instr:KEEP ON HOLD AND PT WILL NOTIFY WHEN NEEDED  aspirin 325 mg oral tablet: 1 tab(s) ( 325 mg ), PO, Daily, # 30 tab(s), 0 Refill(s), Type: Maintenance, OTC (Rx)  atenolol 25 mg oral tablet: 1 tab(s) ( 25 mg ), po, daily, # 90 tab(s), 3 Refill(s), Type: Maintenance, Pharmacy: [facility], 1 tab(s) po daily  hydrochlorothiazide-lisinopril 12.5 mg-10 mg oral tablet: 1 tab(s), PO, Daily, # 90 tab(s), 3 Refill(s), Type: Maintenance, Pharmacy: [facility], 1 tab(s) po daily  metFORMIN 1000 mg oral tablet: 1 tab(s) ( 1,000 mg ), po, bid, # 180 tab(s), 3 Refill(s), Type: Maintenance, Pharmacy:  [facility], 1 tab(s) po bid  tiZANidine 4 mg oral tablet: 1-2 tab(s), po, q8 hrs, PRN: as needed for muscle spasm, # 20 tab(s), 1 Refill(s), Type: Maintenance, Pharmacy: [facility], 1-2 tab(s) po q8 hrs,PRN:as needed for muscle spasm  Documented Medications  Documented  Eucerin topical cream: 1 gurdeep, TOP, BID, 0  Multiple Vitamins oral capsule: 1 cap(s), PO, Daily, 0 Refill(s), Type: Soft Stop   Problem list:    All Problems  Coronary artery disease / SNOMED CT 55731706 / Confirmed  Hyperlipidemia / SNOMED CT 01288881 / Confirmed  Hypertension / SNOMED CT 8314499253 / Confirmed  Impotence / SNOMED CT 5293830954 / Confirmed  Obesity / SNOMED CT 4147381441 / Probable  Diabetes mellitus, type 2 / SNOMED CT 949399245 / Confirmed  Inactive: Diverticulosis / SNOMED CT 1376343634  Canceled: Obese / ICD-9-.00      Histories   Past Medical History:    Active  Diabetes mellitus, type 2 (722305796)  Impotence (8044877711)  Obesity (6503388300)  Coronary artery disease (54010503)   Family History:    Melanoma  Sister (Vilma)  Diabetes mellitus  Sister (Vilma)  Hypertension  Sister (Vilma)  Brother (Michael)  Brother (Bruno)  Mother ()  Diabetes mellitus type 2  Mother ()  Father ()  Myocardial infarction  Father ()  Carotid endarterectomy  Mother ()  Bladder cancer  Father ()     Procedure history:    Colonoscopy (973893198) on 2010 at 60 Years.  Comments:  2/15/2011 12:47 PM - Svitlana Abdalla CMA  Repeat in 10 yrs    2010 10:23 AM - Juliet Rasmussen  Post-op diagnosis:  diverticulosis of colon.  Dilated retinal eye exam with interpretation by an ophthalmologist or optometrist documented and reviewed (DM) () on 2010 at 59 Years.  Flexible sigmoidoscopy (68029885) on 2007 at 57 Years.  Flexible sigmoidoscopy (84604033) on 2004 at 54 Years.  CABG - Coronary artery bypass graft (072780860) on 2000 at 50  Years.  Comments:  5/14/2010 12:42 PM - Maranda Love  Three Vessel.  Arthroscopy of knee (092573064) in 1993 at 43 Years.  Comments:  5/14/2010 12:41 PM - Ivan  Maranda  Right.  Inguinal herniorrhaphy (15573012) in 1966 at 16 Years.  Comments:  9/27/2013 9:54 AM - Destiney Bravo  Right  Inguinal herniorrhaphy (98823504) in 1955 at 5 Years.  Comments:  9/27/2013 9:54 AM - Shelly  Destiney  Left   Social History:        Alcohol Assessment            Current, Wine (5 oz), 2-3 times per week      Tobacco Assessment            Past, Stopped age 50 Years.      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        Physical Examination   vital signs stable, as noted above   Vital Signs   11/28/2017 11:26 AM CST Temperature Tympanic 97.5 DegF  LOW    Peripheral Pulse Rate 73 bpm    Systolic Blood Pressure 131 mmHg    Diastolic Blood Pressure 75 mmHg    Mean Arterial Pressure 94 mmHg    Oxygen Saturation 99 %      Measurements from flowsheet : Measurements   11/28/2017 11:26 AM CST  Weight Measured - Standard                176 lb     General:  Alert and oriented, No acute distress.    Eye:  Extraocular movements are intact.    HENT:  Normocephalic, Tympanic membranes are clear, upper dentures.    Neck:  Supple.    Respiratory:  Respirations are non-labored, diminished breath sounds on right; dullness to palpation on right.    Cardiovascular:  Normal rate, Regular rhythm, No murmur, No edema.    Gastrointestinal:  Soft, Non-distended, Normal bowel sounds, No organomegaly.    Musculoskeletal:  Normal range of motion, Normal strength.    Integumentary:  single actinic keratosis on right cheek.    Neurologic:  Alert, Oriented, Normal motor function, No focal deficits.    Cognition and Speech:  Oriented, Speech clear and coherent.    Psychiatric:  Appropriate mood & affect.       Review / Management   Results review:  Lab results   11/9/2017 10:21 AM  CST  unit/L  LOW    Protein Total 6.4 gm/dL    Albumin Level 3.9 gm/dL    Globulin 2.5    A/G Ratio 1.6   10/19/2017 5:14 PM CDT Hgb A1c 7.5  HI   9/27/2017 7:43 AM CDT Sodium Level 134 mmol/L  LOW    Potassium Level 4.2 mmol/L    Chloride Level 100 mmol/L    CO2 Level 24 mmol/L    Glucose Level 143 mg/dL  HI    BUN 23 mg/dL    Creatinine 0.94 mg/dL    BUN/Creat Ratio NOT APPLICABLE    eGFR 82 mL/min/1.73m2    eGFR African American 95 mL/min/1.73m2    Calcium Level 9.9 mg/dL   .       Impression and Plan   Diagnosis     Hypertension (PAE77-AF I10).     Hyperlipidemia (IGY03-TB E78.5).     Diabetes Mellitus, Type 2 (OKR37-CJ E11.9).     Coronary artery disease (UXZ12-IX I25.10).     AK (actinic keratosis) (WYT62-VY L57.0).         .) recurrent right sided pleural effusion; relatively asymptomatic (remote tobacco use, ceased ~'00)  - originally seen on CXR (10/31/2017)  - CT chest  (11/7/2017) redemonstrating pleural effusion with passive compressive atelectasis; no other described parenchymal disease  - diagnostic/therapeutic thoracentesis (11/9/2017): grossly hemorrhagic quality; exudative effusion (serum , total protein 6.1); negative cytology    - AFB stain negative; fluid culture negative  - repeat CT of chest (11/22/2017): recurrence of effusion with described 4x5 cm RUL posterior mass/consolidation   - will expedite evaluation through [facility]; likely need for biopsy   - if dyspnea worsens in the interim, will need follow-up thoracentesis    plan as previously outlined:     .) DM, controlled  hypoglycemic medications: metformin 1g BID, tanzeum 30mg qweek, empagliflozin 25mg daily  insulin therapy: Lantus 24 units qhs   - other alterative would be introduction of prandial insulin  last HbA1c: 7.5%  microvascular complications: none  macrovascular complications: CAD  ASA/JOSÉ MIGUEL/statin:  ASA 325mg daily, lisinopril 10mg, atorvastatin 20mg QHS    .) HTN, controlled  current  antihypertensive regimen: atenolol 25mg daily, lisinopril/hctz 10/12.5mg daily  regimen changes: none  intolerance:  future titration/work-up plan:     .) CAD, h/o CABG in '00   - on atorvastatin, ASA, atenolol    .) Health maintenance   - CRC screening due in 2020   - PSA 0.4;  plans to monitor q2yrs (recheck at next visit)   - AAA screen normal ('15)   - now retired    RTC as previously scheduled         Professional Services   Counseling Summary:  This was a 25 minute visit with greater than 50% of that time spent counseling the patient.

## 2022-02-16 NOTE — PROGRESS NOTES
Patient:   CHINO LEWIS            MRN: [id number]            FIN: [id number]               Age:   67 years     Sex:  Male     :  1950   Associated Diagnoses:   Hypertension; Hyperlipidemia; Diabetes Mellitus, Type 2; Coronary artery disease; AK (actinic keratosis)   Author:   Antonio Neal MD      Visit Information      Date of Service: 2017 12:55 pm  Performing Location: [facility]  Encounter#: [id number]      Primary Care Provider (PCP):  Antonio Neal MD    NPI# [id number]      Referring Provider:  Antonio Neal MD    NPI# [id number]      Chief Complaint   2017 1:00 PM CST    Patient presents for follow up-chest XR fluid around R lung.              Additional Information:No additional information recorded during visit.   Chief complaint and symptoms as noted above and confirmed with patient      History of Present Illness   3/11/2014: Chino presents to clinic to establish care, previously followed by [facility].  He has a long history of type 2 diabetes without microvascular complications.  He has been maintained on oral hypoglycemics, historically achieving good control.  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  Plans to retire at end of year - basal insulin maybe an option then.  H/o CAD, CABG in  00; no symptoms since.  Says that his gycemic control and weight are generally worse in the winter months - limited activities, increases with warmer weather.   Had been on Medifast, achieved some weight reduction, but couldn't tolerate soy-based products; no longer on diet.  No other voiced complaints.  Shared results of recent labs.    3/14/2017: Presents for general diabetic follow-up.  He switch from Victoza to Tanzeum this past fall.  He increase his Lantus up from 24 units to 28 units.  He is seeing considerable variability in blood sugars in the morning and finds that sugars consistently will rise throughout the day.  No hypoglycemic  episodes.    6/14/2017: Check presents to clinic for diabetic follow-up.  At her last visit he was started on Jardiance 10 mg daily.  He has tolerated medication without problems.  No significant changes in weight or blood pressure.  No described hypoglycemia.  Hemoglobin A1c has improved since last visit.    10/19/2017: Presents for diabetic follow-up.  He did have his Jardiance increased to 25 mg daily.  He has noticed some lower morning sugars in the 70s and 80s.  He has scaled back his Lantus dose from 34 units to 24 units at night.  Otherwise feeling fine.  No other specific complaints or concerns.    10/31/2017: Presents with complaints of midthoracic back pain aggravated after persistent coughing illness.  Pains are within the paraspinal distribution along the right side.  No pleuritic component can take a deep breath without pain.      11/7/2017: Return to clinic for follow-up after visit this past week.  He says he generally feels better from last week.  No back pains.  No shortness of breath.  Still some occasional semi-productive cough.  No positional orthopnea.  No general fluid retention.  No fever chills.         Review of Systems   Constitutional:  No fever, No chills.    Eye:  Negative except as documented in history of present illness.    Ear/Nose/Mouth/Throat:  Negative except as documented in history of present illness.    Respiratory:  Cough, Sputum production, No shortness of breath, No hemoptysis.    Cardiovascular:  No chest pain, No palpitations, No peripheral edema, No syncope.    Gastrointestinal:  No nausea, No vomiting, No abdominal pain.    Genitourinary:  No dysuria, No hematuria.    Hematology/Lymphatics:  Negative except as documented in history of present illness.    Endocrine:  No excessive thirst, No polyuria.    Immunologic:  No recurrent fevers.    Musculoskeletal:  No back pain, No joint pain, No muscle pain.    Neurologic:  Alert and oriented X4, No numbness, No tingling, No  headache.       Health Status   Allergies:    Allergic Reactions (Selected)  No Known Medication Allergies   Medications:  (Selected)   Prescriptions  Prescribed  BD PEN NEEDLES UF SHORT 8MM 100'S 31G 5/16: BD PEN NEEDLES UF SHORT 8MM 100'S 31G 5/16, See Instructions, Instructions: USE DAILY, # 100 EA, 3 Refill(s), Type: Maintenance, Pharmacy: [facility]  BD PEN NEEDLES UF SHORT 8MM 100'S 31G 5/16: See Instructions, Instructions: USE DAILY, # 100 unknown unit, 2 Refill(s), Pharmacy: [facility], USE DAILY  BP Pen Henry UF Short 8mm 100's 31G5/16: BP Pen Henry UF Short 8mm 100's 31G5/16, See Instructions, Instructions: use bid, Supply, # 200 EA, 3 Refill(s), Type: Maintenance, Pharmacy: [facility], use bid  FREESTYLE LITE TEST OMIS55L: See Instructions, Instructions: USE TO TEST FIVE TO SIX TIMES DAILY, # 500 EA, 2 Refill(s), Type: Soft Stop, Pharmacy: [facility], USE TO TEST FIVE TO SIX TIMES DAILY  Freestyle Lite test strips: Freestyle Lite test strips, See Instructions, Instructions: TESTING 5-6x/day, Supply, # 500 EA, 3 Refill(s), Pharmacy: [facility], TESTING 5-6x/day  Freestyle insulinx test strips: Freestyle insulinx test strips, See Instructions, Instructions: testing 2x/ day, Supply, # 200 EA, 3 Refill(s), Type: Maintenance, Pharmacy: [facility], testing 2x/ day  Jardiance 25 mg oral tablet: 1 tab(s) ( 25 mg ), po, qam, # 90 tab(s), 3 Refill(s), Type: Maintenance, Pharmacy: [facility], 1 tab(s) po qam  Lantus Solostar Pen 100 units/mL subcutaneous solution: See Instructions, Instructions: INJECT 34 UNITS UNDER THE SKIN AT BEDTIME, # 45 mL, 3 Refill(s), Type: Maintenance, Pharmacy: [facility], INJECT 34 UNITS UNDER THE SKIN AT BEDTIME  Lipitor 20 mg oral tablet: 1 tab(s) ( 20 mg ), PO, Daily, # 90 tab(s), 3 Refill(s), Type: Maintenance, Pharmacy: [facility],  1 tab(s) po daily  Misc Rx Supply: BD UF short 5/16 pen needles, See Instructions, Instructions: uses BID, Supply, # 200 EA, 3 Refill(s), Type: Maintenance, Pharmacy: [facility], in place of previious rx-keep on hold and pt will notify when needed, uses BID  Tanzeum 30 mg subcutaneous injection: ( 30 mg ), subcutaneous, qweek, # 12 EA, 3 Refill(s), Type: Maintenance, Pharmacy: [facility], 30 mg subcutaneous qweek  Viagra 100 mg oral tablet: 1 tab(s) ( 100 mg ), PO, Daily, Instructions: KEEP ON HOLD AND PT WILL NOTIFY WHEN NEEDED, # 30 tab(s), 11 Refill(s), Type: Maintenance, Pharmacy: [facility], 1 tab(s) po daily,Instr:KEEP ON HOLD AND PT WILL NOTIFY WHEN NEEDED  aspirin 325 mg oral tablet: 1 tab(s) ( 325 mg ), PO, Daily, # 30 tab(s), 0 Refill(s), Type: Maintenance, OTC (Rx)  atenolol 25 mg oral tablet: 1 tab(s) ( 25 mg ), po, daily, # 90 tab(s), 3 Refill(s), Type: Maintenance, Pharmacy: [facility], 1 tab(s) po daily  hydrochlorothiazide-lisinopril 12.5 mg-10 mg oral tablet: 1 tab(s), PO, Daily, # 90 tab(s), 3 Refill(s), Type: Maintenance, Pharmacy: [facility], 1 tab(s) po daily  metFORMIN 1000 mg oral tablet: 1 tab(s) ( 1,000 mg ), po, bid, # 180 tab(s), 3 Refill(s), Type: Maintenance, Pharmacy: [facility], 1 tab(s) po bid  tiZANidine 4 mg oral tablet: 1-2 tab(s), po, q8 hrs, PRN: as needed for muscle spasm, # 20 tab(s), 1 Refill(s), Type: Maintenance, Pharmacy: [id number], 1-2 tab(s) po q8 hrs,PRN:as needed for muscle spasm  Documented Medications  Documented  Eucerin topical cream: 1 gurdeep, TOP, BID, 0  Multiple Vitamins oral capsule: 1 cap(s), PO, Daily, 0 Refill(s), Type: Soft Stop   Problem list:    All Problems  Coronary artery disease / SNOMED CT 06492139 / Confirmed  Hyperlipidemia / SNOMED CT 16590090 / Confirmed  Hypertension / SNOMED CT 5565641472 / Confirmed  Impotence / SNOMED CT  1095260540 / Confirmed  Obesity / SNOMED CT 9368959360 / Probable  Diabetes mellitus, type 2 / SNOMED CT 626844262 / Confirmed  Inactive: Diverticulosis / SNOMED CT 6349586670  Canceled: Obese / ICD-9-.00      Histories   Past Medical History:    Active  Diabetes mellitus, type 2 (382032735)  Impotence (1431034588)  Obesity (9548160900)  Coronary artery disease (80791345)   Family History:    Melanoma  Sister (Vilma)  Diabetes mellitus  Sister (Vilma)  Hypertension  Sister (Vilma)  Brother (Michael)  Brother (Bruno)  Mother ()  Diabetes mellitus type 2  Mother ()  Father ()  Myocardial infarction  Father ()  Carotid endarterectomy  Mother ()  Bladder cancer  Father ()     Procedure history:    Colonoscopy (796044382) on 2010 at 60 Years.  Comments:  2/15/2011 12:47 PM - Svitlana Abdalla CMA  Repeat in 10 yrs    2010 10:23 AM - Juliet Rasmussen  Post-op diagnosis:  diverticulosis of colon.  Dilated retinal eye exam with interpretation by an ophthalmologist or optometrist documented and reviewed (DM) () on 2010 at 59 Years.  Flexible sigmoidoscopy (33558259) on 2007 at 57 Years.  Flexible sigmoidoscopy (44205601) on 2004 at 54 Years.  CABG - Coronary artery bypass graft (475298197) on 2000 at 50 Years.  Comments:  2010 12:42 PM - Maranda Love  Three Vessel.  Arthroscopy of knee (361137421) in  at 43 Years.  Comments:  2010 12:41 PM - Maranda Love  Right.  Inguinal herniorrhaphy (69338160) in 1966 at 16 Years.  Comments:  2013 9:54 AM - Destiney Bravo  Right  Inguinal herniorrhaphy (87810072) in 1955 at 5 Years.  Comments:  2013 9:54 AM - Destiney Bravo  Left   Social History:        Alcohol Assessment            Current, Wine (5 oz), 2-3 times per week      Tobacco Assessment            Past, Stopped age 50 Years.      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        Physical Examination   vital signs stable, as noted above   Vital Signs   11/7/2017 1:00 PM CST Temperature Tympanic 97.4 DegF  LOW    Peripheral Pulse Rate 68 bpm    HR Method Electronic    Systolic Blood Pressure 130 mmHg    Diastolic Blood Pressure 60 mmHg    Mean Arterial Pressure 83 mmHg    BP Site Right arm    BP Method Manual    Oxygen Saturation 98 %      Measurements from flowsheet : Measurements   11/7/2017 1:00 PM CST Height Measured - Standard 66.25 in    Weight Measured - Standard 181.8 lb    BSA 1.96 m2    Body Mass Index 29.12 kg/m2      General:  Alert and oriented, No acute distress.    Eye:  Extraocular movements are intact.    HENT:  Normocephalic, Tympanic membranes are clear, upper dentures.    Neck:  Supple.    Respiratory:  Respirations are non-labored, diminished breath sounds on right; dullness to palpation on right.    Cardiovascular:  Normal rate, Regular rhythm, No murmur, No edema.    Gastrointestinal:  Soft, Non-distended, Normal bowel sounds, No organomegaly.    Musculoskeletal:  Normal range of motion, Normal strength.    Integumentary:  single actinic keratosis on right cheek.    Neurologic:  Alert, Oriented, Normal motor function, No focal deficits.    Cognition and Speech:  Oriented, Speech clear and coherent.    Psychiatric:  Appropriate mood & affect.       Review / Management   Results review:  Lab results   10/19/2017 5:14 PM CDT Hgb A1c 7.5  HI   9/27/2017 7:43 AM CDT Sodium Level 134 mmol/L  LOW    Potassium Level 4.2 mmol/L    Chloride Level 100 mmol/L    CO2 Level 24 mmol/L    Glucose Level 143 mg/dL  HI    BUN 23 mg/dL    Creatinine 0.94 mg/dL    BUN/Creat Ratio NOT APPLICABLE    eGFR 82 mL/min/1.73m2    eGFR African American 95 mL/min/1.73m2    Calcium Level 9.9 mg/dL   .       Impression and Plan   Diagnosis     Hypertension (XUX45-UK I10).     Hyperlipidemia (GGQ02-JO E78.5).     Diabetes Mellitus, Type 2 (KCV52-KW E11.9).      Coronary artery disease (VIA09-HU I25.10).     AK (actinic keratosis) (AJL83-ZK L57.0).         .) right sided pleural effusion; relatively asymptomatic (remote tobacco use, ceased ~'00)  - originally seen on CXR (10/31/2017)  - CT chest today redemonstrating pleural effusion with passive compressive atelectasis; no other described parenchymal disease  - making arrangements for diagnostic/therapeutic thoracentesis   - needs cell count, gram stain, Cx, LDH, total protein, cytology processed   - needs serum LDH and total protein  - plans for repeat CT of chest 2 weeks after thoracentesis to confirm resolution of effusion and to assess for any masked lesion/mass   - if effusion persists, likely to need to see thoracic surgery    plan as previously outlined:     .) DM, controlled  hypoglycemic medications: metformin 1g BID, tanzeum 30mg qweek, empagliflozin 25mg daily  insulin therapy: Lantus 24 units qhs   - other alterative would be introduction of prandial insulin  last HbA1c: 7.5%  microvascular complications: none  macrovascular complications: CAD  ASA/JOSÉ MIGUEL/statin:  ASA 325mg daily, lisinopril 10mg, atorvastatin 20mg QHS    .) HTN, controlled  current antihypertensive regimen: atenolol 25mg daily, lisinopril/hctz 10/12.5mg daily  regimen changes: none  intolerance:  future titration/work-up plan:     .) CAD, h/o CABG in '00   - on atorvastatin, ASA, atenolol    .) Health maintenance   - CRC screening due in 2020   - PSA 0.4;  plans to monitor q2yrs (recheck at next visit)   - AAA screen normal ('15)   - now retired    RTC as previously scheduled         Professional Services   Counseling Summary:  This was a 25 minute visit with greater than 50% of that time spent counseling the patient.

## 2022-02-16 NOTE — PROGRESS NOTES
Patient:   CHINO LEIWS            MRN: [id number]            FIN: [id number]               Age:   67 years     Sex:  Male     :  1950   Associated Diagnoses:   Hypertension; Hyperlipidemia; Diabetes Mellitus, Type 2; Coronary artery disease; AK (actinic keratosis)   Author:   Antonio Neal MD      Visit Information      Date of Service: 10/31/2017 09:08 am  Performing Location: [facility]  Encounter#: [id number]      Primary Care Provider (PCP):  Antonio Neal MD    NPI# [id number]      Referring Provider:  Antonio Neal MD    NPI# [id number]      Chief Complaint   10/31/2017 9:13 AM CDT   Pt here for low to mid back pain x week.  Pt also c/o productive cough and chest congestion x month.              Additional Information:No additional information recorded during visit.   Chief complaint and symptoms as noted above and confirmed with patient      History of Present Illness   3/11/2014: Chino presents to clinic to establish care, previously followed by [facility].  He has a long history of type 2 diabetes without microvascular complications.  He has been maintained on oral hypoglycemics, historically achieving good control.  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  Plans to retire at end of year - basal insulin maybe an option then.  H/o CAD, CABG in  00; no symptoms since.  Says that his gycemic control and weight are generally worse in the winter months - limited activities, increases with warmer weather.   Had been on Medifast, achieved some weight reduction, but couldn't tolerate soy-based products; no longer on diet.  No other voiced complaints.  Shared results of recent labs.    3/14/2017: Presents for general diabetic follow-up.  He switch from Victoza to Tanzeum this past fall.  He increase his Lantus up from 24 units to 28 units.  He is seeing considerable variability in blood sugars in the morning and finds that sugars consistently will rise throughout the  day.  No hypoglycemic episodes.    6/14/2017: Check presents to clinic for diabetic follow-up.  At her last visit he was started on Jardiance 10 mg daily.  He has tolerated medication without problems.  No significant changes in weight or blood pressure.  No described hypoglycemia.  Hemoglobin A1c has improved since last visit.    10/19/2017: Presents for diabetic follow-up.  He did have his Jardiance increased to 25 mg daily.  He has noticed some lower morning sugars in the 70s and 80s.  He has scaled back his Lantus dose from 34 units to 24 units at night.  Otherwise feeling fine.  No other specific complaints or concerns.    10/31/2017: Presents with complaints of midthoracic back pain aggravated after persistent coughing illness.  Pains are within the paraspinal distribution along the right side.  No pleuritic component can take a deep breath without pain.         Review of Systems   Constitutional:  No fever, No chills.    Eye:  Negative except as documented in history of present illness.    Ear/Nose/Mouth/Throat:  Negative except as documented in history of present illness.    Respiratory:  No shortness of breath.    Cardiovascular:  No chest pain, No palpitations, No peripheral edema, No syncope.    Gastrointestinal:  No nausea, No vomiting, No abdominal pain.    Genitourinary:  No dysuria, No hematuria.    Hematology/Lymphatics:  Negative except as documented in history of present illness.    Endocrine:  No excessive thirst, No polyuria.    Immunologic:  No recurrent fevers.    Musculoskeletal:  Joint pain, No muscle pain.         Back pain: In the middle of the back.    Neurologic:  Alert and oriented X4, No numbness, No tingling, No headache.       Health Status   Allergies:    Allergic Reactions (Selected)  No known allergies   Medications:  (Selected)   Prescriptions  Prescribed  BD PEN NEEDLES UF SHORT 8MM 100'S 31G 5/16: BD PEN NEEDLES UF SHORT 8MM 100'S 31G 5/16, See Instructions, Instructions: USE  DAILY, # 100 EA, 3 Refill(s), Type: Maintenance, Pharmacy: [facility]  BD PEN NEEDLES UF SHORT 8MM 100'S 31G 5/16: See Instructions, Instructions: USE DAILY, # 100 unknown unit, 2 Refill(s), Pharmacy: [facility], USE DAILY  BP Pen Henry UF Short 8mm 100's 31G5/16: BP Pen Henry UF Short 8mm 100's 31G5/16, See Instructions, Instructions: use bid, Supply, # 200 EA, 3 Refill(s), Type: Maintenance, Pharmacy: [facility], use bid  FREESTYLE LITE TEST POWR20N: See Instructions, Instructions: USE TO TEST FIVE TO SIX TIMES DAILY, # 500 EA, 2 Refill(s), Type: Soft Stop, Pharmacy: [facility], USE TO TEST FIVE TO SIX TIMES DAILY  Freestyle Lite test strips: Freestyle Lite test strips, See Instructions, Instructions: TESTING 5-6x/day, Supply, # 500 EA, 3 Refill(s), Pharmacy: [facility], TESTING 5-6x/day  Freestyle insulinx test strips: Freestyle insulinx test strips, See Instructions, Instructions: testing 2x/ day, Supply, # 200 EA, 3 Refill(s), Type: Maintenance, Pharmacy: [facility], testing 2x/ day  Jardiance 25 mg oral tablet: 1 tab(s) ( 25 mg ), po, qam, # 90 tab(s), 3 Refill(s), Type: Maintenance, Pharmacy: [facility], 1 tab(s) po qam  Lantus Solostar Pen 100 units/mL subcutaneous solution: See Instructions, Instructions: INJECT 34 UNITS UNDER THE SKIN AT BEDTIME, # 45 mL, 3 Refill(s), Type: Maintenance, Pharmacy: [facility], INJECT 34 UNITS UNDER THE SKIN AT BEDTIME  Lipitor 20 mg oral tablet: 1 tab(s) ( 20 mg ), PO, Daily, # 90 tab(s), 3 Refill(s), Type: Maintenance, Pharmacy: [facility], 1 tab(s) po daily  Misc Rx Supply: BD UF short 5/16 pen needles, See Instructions, Instructions: uses BID, Supply, # 200 EA, 3 Refill(s), Type: Maintenance, Pharmacy: [facility], in place of previious rx-keep on hold and pt will notify when needed, uses BID  Tanzeum  30 mg subcutaneous injection: ( 30 mg ), subcutaneous, qweek, # 12 EA, 3 Refill(s), Type: Maintenance, Pharmacy: [facility], 30 mg subcutaneous qweek  Viagra 100 mg oral tablet: 1 tab(s) ( 100 mg ), PO, Daily, Instructions: KEEP ON HOLD AND PT WILL NOTIFY WHEN NEEDED, # 30 tab(s), 11 Refill(s), Type: Maintenance, Pharmacy: [facility], 1 tab(s) po daily,Instr:KEEP ON HOLD AND PT WILL NOTIFY WHEN NEEDED  aspirin 325 mg oral tablet: 1 tab(s) ( 325 mg ), PO, Daily, # 30 tab(s), 0 Refill(s), Type: Maintenance, OTC (Rx)  atenolol 25 mg oral tablet: 1 tab(s) ( 25 mg ), po, daily, # 90 tab(s), 3 Refill(s), Type: Maintenance, Pharmacy: [facility], 1 tab(s) po daily  hydrochlorothiazide-lisinopril 12.5 mg-10 mg oral tablet: 1 tab(s), PO, Daily, # 90 tab(s), 3 Refill(s), Type: Maintenance, Pharmacy: [facility], 1 tab(s) po daily  metFORMIN 1000 mg oral tablet: 1 tab(s) ( 1,000 mg ), po, bid, # 180 tab(s), 3 Refill(s), Type: Maintenance, Pharmacy: [facility], 1 tab(s) po bid  tiZANidine 4 mg oral tablet: 1-2 tab(s), po, q8 hrs, PRN: as needed for muscle spasm, # 20 tab(s), 1 Refill(s), Type: Maintenance, Pharmacy: [id number], 1-2 tab(s) po q8 hrs,PRN:as needed for muscle spasm  Documented Medications  Documented  Eucerin topical cream: 1 gurdeep, TOP, BID, 0  Multiple Vitamins oral capsule: 1 cap(s), PO, Daily, 0 Refill(s), Type: Soft Stop   Problem list:    All Problems  Coronary artery disease / SNOMED CT 78327494 / Confirmed  Hyperlipidemia / SNOMED CT 26721824 / Confirmed  Hypertension / SNOMED CT 6069663061 / Confirmed  Impotence / SNOMED CT 6613078457 / Confirmed  Obesity / SNOMED CT 6187492680 / Probable  Diabetes mellitus, type 2 / SNOMED CT 176094213 / Confirmed  Inactive: Diverticulosis / SNOMED CT 5220448391  Canceled: Obese / ICD-9-.00      Histories   Past Medical History:    Active  Diabetes mellitus, type  2 (600379490)  Impotence (3794206097)  Obesity (6867297554)  Coronary artery disease (95578059)   Family History:    Melanoma  Sister (Vilma)  Diabetes mellitus  Sister (Vilma)  Hypertension  Sister (Vilma)  Brother (Michael)  Brother (Bruno)  Mother ()  Diabetes mellitus type 2  Mother ()  Father ()  Myocardial infarction  Father ()  Carotid endarterectomy  Mother ()  Bladder cancer  Father ()     Procedure history:    Colonoscopy (509858647) on 2010 at 60 Years.  Comments:  2/15/2011 12:47 PM - Svitlana Abdalla CMA  Repeat in 10 yrs    2010 10:23 AM - Juliet Rasmussen  Post-op diagnosis:  diverticulosis of colon.  Dilated retinal eye exam with interpretation by an ophthalmologist or optometrist documented and reviewed (DM) () on 2010 at 59 Years.  Flexible sigmoidoscopy (13054770) on 2007 at 57 Years.  Flexible sigmoidoscopy (43330003) on 2004 at 54 Years.  CABG - Coronary artery bypass graft (602342299) on 2000 at 50 Years.  Comments:  2010 12:42 PM - Maranda Love  Three Vessel.  Arthroscopy of knee (861489955) in  at 43 Years.  Comments:  2010 12:41 PM - Maranda Love  Right.  Inguinal herniorrhaphy (19215290) in 1966 at 16 Years.  Comments:  2013 9:54 AM - Destiney Bravo  Right  Inguinal herniorrhaphy (40576573) in 1955 at 5 Years.  Comments:  2013 9:54 AM - Destniey Bravo  Left   Social History:        Alcohol Assessment            Current, Wine (5 oz), 2-3 times per week      Tobacco Assessment            Past, Stopped age 50 Years.      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        Physical Examination   vital signs stable, as noted above   Vital Signs   10/31/2017 9:13 AM CDT Temperature Tympanic 96.2 DegF  LOW    Peripheral Pulse Rate 80 bpm    Pulse Site Radial artery    Respiratory Rate 20 br/min    Systolic Blood  Pressure 112 mmHg    Diastolic Blood Pressure 66 mmHg    Mean Arterial Pressure 81 mmHg    BP Site Right arm    Oxygen Saturation 98 %      Measurements from flowsheet : Measurements   10/31/2017 9:13 AM CDT Height Measured - Standard 66.25 in    Weight Measured - Standard 181 lb    BSA 1.96 m2    Body Mass Index 28.99 kg/m2      General:  Alert and oriented, No acute distress.    Eye:  Extraocular movements are intact.    HENT:  Normocephalic, Tympanic membranes are clear, upper dentures.    Neck:  Supple.    Respiratory:  Lungs are clear to auscultation, Respirations are non-labored.    Cardiovascular:  Normal rate, No murmur, No edema.    Gastrointestinal:  Soft, Non-distended, Normal bowel sounds, No organomegaly.    Musculoskeletal:  Normal range of motion, Normal strength, focal paraspinal pain on palpation; normal PROM.    Integumentary:  single actinic keratosis on right cheek.    Neurologic:  Alert, Oriented, Normal motor function, No focal deficits.    Cognition and Speech:  Oriented, Speech clear and coherent.    Psychiatric:  Appropriate mood & affect.       Review / Management   Results review:  Lab results   10/19/2017 5:14 PM CDT Hgb A1c 7.5  HI   9/27/2017 7:43 AM CDT Sodium Level 134 mmol/L  LOW    Potassium Level 4.2 mmol/L    Chloride Level 100 mmol/L    CO2 Level 24 mmol/L    Glucose Level 143 mg/dL  HI    BUN 23 mg/dL    Creatinine 0.94 mg/dL    BUN/Creat Ratio NOT APPLICABLE    eGFR 82 mL/min/1.73m2    eGFR African American 95 mL/min/1.73m2    Calcium Level 9.9 mg/dL   .       Impression and Plan   Diagnosis     Hypertension (LHR92-BW I10).     Hyperlipidemia (CVA14-WX E78.5).     Diabetes Mellitus, Type 2 (QKW05-OI E11.9).     Coronary artery disease (ODV40-YN I25.10).     AK (actinic keratosis) (ICQ66-HB L57.0).         .) thoracic back pains - paraspinal muscle spasm/strain; potential pleuritic features?  - tizanidine 4mg TID prn provided  - obtain CXR given persistent cough component  -  generally recommending more conservative medical therapies; heat application    plan as previously outlined:     .) DM, controlled  hypoglycemic medications: metformin 1g BID, tanzeum 30mg qweek, empagliflozin 25mg daily  insulin therapy: Lantus 24 units qhs   - other alterative would be introduction of prandial insulin  last HbA1c: 7.5%  microvascular complications: none  macrovascular complications: CAD  ASA/JOSÉ MIGUEL/statin:  ASA 325mg daily, lisinopril 10mg, atorvastatin 20mg QHS    .) HTN, controlled  current antihypertensive regimen: atenolol 25mg daily, lisinopril/hctz 10/12.5mg daily  regimen changes: none  intolerance:  future titration/work-up plan:     .) CAD, h/o CABG in '00   - on atorvastatin, ASA, atenolol    .) Health maintenance   - CRC screening due in 2020   - PSA 0.4;  plans to monitor q2yrs (recheck at next visit)   - AAA screen normal ('15)   - now retired    RTC as previously scheduled         Professional Services   Counseling Summary:  This was a 15 minute visit with greater than 50% of that time spent counseling the patient.

## 2022-02-16 NOTE — PROGRESS NOTES
Patient:   CHINO LEWIS            MRN: [id number]            FIN: [id number]               Age:   67 years     Sex:  Male     :  1950   Associated Diagnoses:   Hypertension; Hyperlipidemia; Diabetes Mellitus, Type 2; Coronary artery disease; Non-small cell lung cancer (NSCLC)   Author:   Antonio Neal MD      Visit Information      Date of Service: 2018 05:45 pm  Performing Location: [facility]  Encounter#: [id number]      Primary Care Provider (PCP):  Antonio Neal MD    NPI# [id number]      Referring Provider:  Antonio Neal MD    NPI# [id number]      Chief Complaint   2018 6:01 PM CST    discuss BS's with chemo              Additional Information:No additional information recorded during visit.   Chief complaint and symptoms as noted above and confirmed with patient      History of Present Illness   3/11/2014: Chino presents to clinic to [facility] care, previously followed by [facility].  He has a long history of type 2 diabetes without microvascular complications.  He has been maintained on oral hypoglycemics, historically achieving good control.  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  Plans to retire at end of year - basal insulin maybe an option then.  H/o CAD, CABG in  00; no symptoms since.  Says that his gycemic control and weight are generally worse in the winter months - limited activities, increases with warmer weather.   Had been on Medifast, achieved some weight reduction, but couldn't tolerate soy-based products; no longer on diet.  No other voiced complaints.  Shared results of recent labs.    3/14/2017: Presents for general diabetic follow-up.  He switch from Victoza to Tanzeum this past fall.  He increase his Lantus up from 24 units to 28 units.  He is seeing considerable variability in blood sugars in the morning and finds that sugars consistently will rise throughout the day.  No hypoglycemic episodes.    2017: Check presents to  clinic for diabetic follow-up.  At her last visit he was started on Jardiance 10 mg daily.  He has tolerated medication without problems.  No significant changes in weight or blood pressure.  No described hypoglycemia.  Hemoglobin A1c has improved since last visit.    10/19/2017: Robbie returns to clinic after undergoing recent two-week follow-up CT of the previously drained right-sided pleural effusion.  Pleural studies consistent with an exudative effusion.  Grossly having a partial hemorrhagic quality.  Cytology negative.  He has noticed worsening positional dyspnea especially when lying down on the left side.  Describes some cough with blood-tinged component at times.  Denies any fever chills.  Follow-up CT on November 22 shows a general recurrence of his effusion though not to degree of previous imaging.  Now description of a right upper lobe posterior 4 x 4 centimeter consolidation.      1/25/2018: Robbie returns to clinic for general follow-up.  Since our last visit he underwent biopsy of lung mass in addition to left femoral bone biopsy confirming poorly differentiated adenocarcinoma.  He is currently being followed by Dr. Dwyer and has currently completed 2 rounds of chemotherapy.  His biopsy was moderately positive for a PDL 1 genetic marker.  Tolerating chemotherapy without any noticeable problems other than transient hyperglycemia.  For cycle performed with dexamethasone.  Subsequent cycle without steroids.  He says that he see significant blood sugar lability for approximately 4-5 days after chemo and then later stabilizes.  Weights remain stable.  No nausea or vomiting.         Review of Systems   Constitutional:  No fever, No chills.    Eye:  Negative except as documented in history of present illness.    Ear/Nose/Mouth/Throat:  Negative except as documented in history of present illness.    Respiratory:  No shortness of breath, No cough, No sputum production, No hemoptysis.    Cardiovascular:  No chest  pain, No palpitations, No peripheral edema, No syncope.    Gastrointestinal:  No nausea, No vomiting, No abdominal pain.    Genitourinary:  No dysuria, No hematuria.    Hematology/Lymphatics:  Negative except as documented in history of present illness.    Endocrine:  No excessive thirst, No polyuria.    Immunologic:  No recurrent fevers.    Musculoskeletal:  No back pain, No joint pain, No muscle pain.    Neurologic:  Alert and oriented X4, No numbness, No tingling, No headache.       Health Status   Allergies:    Allergic Reactions (Selected)  No Known Medication Allergies   Medications:  (Selected)   Prescriptions  Prescribed  BD PEN NEEDLES UF SHORT 8MM 100'S 31G 5/16: BD PEN NEEDLES UF SHORT 8MM 100'S 31G 5/16, See Instructions, Instructions: USE DAILY, # 100 EA, 3 Refill(s), Type: Maintenance, Pharmacy: [facility]  BD PEN NEEDLES UF SHORT 8MM 100'S 31G 5/16: See Instructions, Instructions: USE DAILY, # 100 unknown unit, 2 Refill(s), Pharmacy: [facility], USE DAILY  BP Pen Henry UF Short 8mm 100's 31G5/16: BP Pen Henry UF Short 8mm 100's 31G5/16, See Instructions, Instructions: use bid, Supply, # 200 EA, 3 Refill(s), Type: Maintenance, Pharmacy: [facility], use bid  FREESTYLE LITE TEST BCCE16S: FREESTYLE LITE TEST OUIM24I, See Instructions, Instructions: USE TO TEST FIVE TO SIX TIMES DAILY, Supply, # 500 EA, 2 Refill(s), Type: Soft Stop, Pharmacy: [id number], USE TO TEST FIVE TO SIX TIMES DAILY  Freestyle Lite test strips: Freestyle Lite test strips, See Instructions, Instructions: TESTING 5-6x/day, Supply, # 500 EA, 3 Refill(s), Pharmacy: [facility], TESTING 5-6x/day  Freestyle insulinx test strips: Freestyle insulinx test strips, See Instructions, Instructions: testing 2x/ day, Supply, # 200 EA, 3 Refill(s), Type: Maintenance, Pharmacy: [facility], testing 2x/ day  Jardiance 25 mg oral tablet: 1 tab(s) ( 25 mg ), po,  qam, # 90 tab(s), 3 Refill(s), Type: Maintenance, Pharmacy: [facility], 1 tab(s) po qam  Lantus Solostar Pen 100 units/mL subcutaneous solution: See Instructions, Instructions: INJECT 34 UNITS UNDER THE SKIN AT BEDTIME, # 45 mL, 3 Refill(s), Type: Maintenance, Pharmacy: [facility], INJECT 34 UNITS UNDER THE SKIN AT BEDTIME  Lipitor 20 mg oral tablet: 1 tab(s) ( 20 mg ), PO, Daily, # 90 tab(s), 3 Refill(s), Type: Maintenance, Pharmacy: [facility], 1 tab(s) po daily  Misc Rx Supply: BD UF short 5/16 pen needles, See Instructions, Instructions: uses BID, Supply, # 200 EA, 3 Refill(s), Type: Maintenance, Pharmacy: [facility], in place of previious rx-keep on hold and pt will notify when needed, uses BID  Tanzeum 30 mg subcutaneous injection: ( 30 mg ), subcutaneous, qweek, # 12 EA, 3 Refill(s), Type: Maintenance, Pharmacy: [facility], 30 mg subcutaneous qweek  Viagra 100 mg oral tablet: 1 tab(s) ( 100 mg ), PO, Daily, Instructions: KEEP ON HOLD AND PT WILL NOTIFY WHEN NEEDED, # 30 tab(s), 11 Refill(s), Type: Maintenance, Pharmacy: [facility], 1 tab(s) po daily,Instr:KEEP ON HOLD AND PT WILL NOTIFY WHEN NEEDED  aspirin 325 mg oral tablet: 1 tab(s) ( 325 mg ), PO, Daily, # 30 tab(s), 0 Refill(s), Type: Maintenance, OTC (Rx)  atenolol 25 mg oral tablet: 1 tab(s) ( 25 mg ), po, daily, # 90 tab(s), 3 Refill(s), Type: Maintenance, Pharmacy: [facility], 1 tab(s) po daily  hydrochlorothiazide-lisinopril 12.5 mg-10 mg oral tablet: 1 tab(s), PO, Daily, # 90 tab(s), 3 Refill(s), Type: Maintenance, Pharmacy: [facility], 1 tab(s) po daily  metFORMIN 1000 mg oral tablet: 1 tab(s) ( 1,000 mg ), po, bid, # 180 tab(s), 3 Refill(s), Type: Maintenance, Pharmacy: [facility], 1 tab(s) po bid  Documented Medications  Documented  Eucerin topical cream: 1 gurdeep, TOP, BID,  0  Flonase 50 mcg/inh nasal spray: 1 spray(s), nasal, daily, 0 Refill(s), Type: Maintenance  LORazepam 0.5 mg oral tablet: 1 tab(s) ( 0.5 mg ), po, hs, 0 Refill(s), Type: Maintenance  Mucinex 600 mg oral tablet, extended release: 1 tab(s) ( 600 mg ), po, q12 hrs, 0 Refill(s), Type: Maintenance  Multiple Vitamins oral capsule: 1 cap(s), PO, Daily, 0 Refill(s), Type: Soft Stop  OLANZapine 2.5 mg oral tablet: See Instructions, Instructions: 1 tab daily for 4dasy starting the day of chemo, 0 Refill(s), Type: Maintenance  folic acid 1 mg oral tablet: 1 tab(s) ( 1 mg ), po, daily, 0 Refill(s), Type: Maintenance  prochlorperazine 10 mg oral tablet: 1 tab(s) ( 10 mg ), po, q4 hrs, PRN: for nausea/vomiting, 0 Refill(s), Type: Maintenance   Problem list:    All Problems  Coronary artery disease / SNOMED CT 23901052 / Confirmed  Hyperlipidemia / SNOMED CT 71091004 / Confirmed  Hypertension / SNOMED CT 8961371980 / Confirmed  Impotence / SNOMED CT 2181022027 / Confirmed  Non-small cell carcinoma of lung / SNOMED CT 925908524 / Confirmed  Obesity / SNOMED CT 3195256537 / Probable  Diabetes mellitus, type 2 / SNOMED CT 754892498 / Confirmed  Inactive: Diverticulosis / SNOMED CT 4056325244  Canceled: Obese / ICD-9-.00      Histories   Past Medical History:    Active  Diabetes mellitus, type 2 (388498786)  Impotence (6781671306)  Obesity (9184415307)  Coronary artery disease (12151767)  Non-small cell carcinoma of lung (093940712)   Family History:    Melanoma  Sister (Vilma)  Diabetes mellitus  Sister (Vilma)  Hypertension  Sister (Vilma)  Brother (Michael)  Brother (Bruno)  Mother ()  Diabetes mellitus type 2  Mother ()  Father ()  Myocardial infarction  Father ()  Carotid endarterectomy  Mother ()  Bladder cancer  Father ()     Procedure history:    Diagnostic bronchoscopy (569545547) on 2017 at 67 Years.  Colonoscopy (630323623) on 2010 at 60  Years.  Comments:  2/15/2011 12:47 PM - Rm Svitlana LUEVANO  Repeat in 10 yrs    12/9/2010 10:23 AM - Juliet Rasmussen  Post-op diagnosis:  diverticulosis of colon.  Dilated retinal eye exam with interpretation by an ophthalmologist or optometrist documented and reviewed (DM) (2022F) on 2/26/2010 at 59 Years.  Flexible sigmoidoscopy (00618938) on 11/11/2007 at 57 Years.  Flexible sigmoidoscopy (20281747) on 11/11/2004 at 54 Years.  CABG - Coronary artery bypass graft (650269804) on 11/13/2000 at 50 Years.  Comments:  5/14/2010 12:42 PM - Maranda Love  Three Vessel.  Arthroscopy of knee (385618732) in 1993 at 43 Years.  Comments:  5/14/2010 12:41 PM - Maranda Love  Right.  Inguinal herniorrhaphy (55716406) in 1966 at 16 Years.  Comments:  9/27/2013 9:54 AM - Destiney Bravo  Right  Inguinal herniorrhaphy (05461806) in 1955 at 5 Years.  Comments:  9/27/2013 9:54 AM - Destiney Bravo  Left   Social History:        Alcohol Assessment            Current, Wine (5 oz), 2-3 times per week      Tobacco Assessment            Past, Stopped age 50 Years.      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        Physical Examination   vital signs stable, as noted above   Vital Signs   1/25/2018 6:01 PM CST Temperature Tympanic 98.3 DegF    Peripheral Pulse Rate 64 bpm    Systolic Blood Pressure 150 mmHg  HI    Diastolic Blood Pressure 58 mmHg  LOW    Mean Arterial Pressure 89 mmHg      Measurements from flowsheet : Measurements   1/25/2018 6:01 PM CST    Weight Measured - Standard                171.6 lb     General:  Alert and oriented, No acute distress.    Eye:  Extraocular movements are intact.    HENT:  Normocephalic, Tympanic membranes are clear, upper dentures.    Neck:  Supple.    Respiratory:  Respirations are non-labored, diminished breath sounds on right; dullness to palpation on right.    Cardiovascular:  Normal rate, Regular rhythm, No  murmur, No edema, Infusaport in place.    Gastrointestinal:  Soft, Non-distended, Normal bowel sounds, No organomegaly.    Musculoskeletal:  Normal range of motion, Normal strength.    Integumentary:  single actinic keratosis on right cheek.    Neurologic:  Alert, Oriented, Normal motor function, No focal deficits.    Cognition and Speech:  Oriented, Speech clear and coherent.    Psychiatric:  Appropriate mood & affect.       Review / Management   Results review:  Lab results   11/9/2017 10:21 AM CST  unit/L  LOW    Protein Total 6.4 gm/dL    Albumin Level 3.9 gm/dL    Globulin 2.5    A/G Ratio 1.6   10/19/2017 5:14 PM CDT Hgb A1c 7.5  HI   .       Impression and Plan   Diagnosis     Hypertension (EHK37-NK I10).     Hyperlipidemia (NMO57-HY E78.5).     Diabetes Mellitus, Type 2 (EGQ20-MF E11.9).     Coronary artery disease (AZA90-SH I25.10).     Non-small cell lung cancer (NSCLC) (HCC08-BL C34.90).         .) metastatic poorly differentiated adenocarcinoma of the lung; PDL1 +  - originally seen on CXR (10/31/2017)  - CT chest  (11/7/2017) redemonstrating pleural effusion with passive compressive atelectasis; no other described parenchymal disease  - diagnostic/therapeutic thoracentesis (11/9/2017): grossly hemorrhagic quality; exudative effusion (serum , total protein 6.1); negative cytology    - AFB stain negative; fluid culture negative  - repeat CT of chest (11/22/2017): recurrence of effusion with described 4x5 cm RUL posterior mass/consolidation  - PET demonstrating FDG avid lung mass and separate left femoral bone lesion  - biopsy positive for 50% PDL1 mutation  - started on Keytruda, Alimta, carbplatin + Zometa (completed 2 of planned 6 cycles)   - overall tolerating therapy well   - no longer using Decadron   - for now, I think hyperglycemia is acceptable (appears to be limited to a few days after chemo); hold off on transition to insulin for now. If renal function worsening, anticipate  stopping metformin, Tanzeum, empagliflozin and transition to bolus insulin    plan as previously outlined:     .) DM, controlled  hypoglycemic medications: metformin 1g BID, tanzeum 30mg qweek, empagliflozin 25mg daily  insulin therapy: Lantus 24 units qhs   - other alterative would be introduction of prandial insulin  last HbA1c: 7.5%  microvascular complications: none  macrovascular complications: CAD  ASA/JOSÉ MIGUEL/statin:  ASA 325mg daily, lisinopril 10mg, atorvastatin 20mg QHS    .) HTN, controlled  current antihypertensive regimen: atenolol 25mg daily, lisinopril/hctz 10/12.5mg daily  regimen changes: none  intolerance:  future titration/work-up plan:     .) CAD, h/o CABG in '00   - on atorvastatin, ASA, atenolol    .) Health maintenance   - CRC screening due in 2020   - PSA 0.4;  plans to monitor q2yrs (recheck at next visit)   - AAA screen normal ('15)   - now retired    RTC as previously scheduled         Professional Services   Counseling Summary:  This was a 25 minute visit with greater than 50% of that time spent counseling the patient.

## 2022-02-16 NOTE — PROGRESS NOTES
Patient:   CHINO LEWIS            MRN: [id number]            FIN: [id number]               Age:   67 years     Sex:  Male     :  1950   Associated Diagnoses:   Hypertension; Hyperlipidemia; Diabetes Mellitus, Type 2; Coronary artery disease; AK (actinic keratosis)   Author:   Antonio Neal MD      Visit Information      Date of Service: 10/19/2017 04:01 pm  Performing Location: [facility]  Encounter#: [id number]      Primary Care Provider (PCP):  Antonio Neal MD    NPI# [id number]      Referring Provider:  Antonio Neal MD    NPI# [id number]      Chief Complaint   10/19/2017 4:35 PM CDT   DM check              Additional Information:No additional information recorded during visit.   Chief complaint and symptoms as noted above and confirmed with patient      History of Present Illness   3/11/2014: Chino presents to clinic to establish care, previously followed by [facility].  He has a long history of type 2 diabetes without microvascular complications.  He has been maintained on oral hypoglycemics, historically achieving good control.  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  Plans to retire at end of year - basal insulin maybe an option then.  H/o CAD, CABG in  00; no symptoms since.  Says that his gycemic control and weight are generally worse in the winter months - limited activities, increases with warmer weather.   Had been on Medifast, achieved some weight reduction, but couldn't tolerate soy-based products; no longer on diet.  No other voiced complaints.  Shared results of recent labs.    3/14/2017: Presents for general diabetic follow-up.  He switch from Victoza to Tanzeum this past fall.  He increase his Lantus up from 24 units to 28 units.  He is seeing considerable variability in blood sugars in the morning and finds that sugars consistently will rise throughout the day.  No hypoglycemic episodes.    2017: Check presents to clinic for diabetic  follow-up.  At her last visit he was started on Jardiance 10 mg daily.  He has tolerated medication without problems.  No significant changes in weight or blood pressure.  No described hypoglycemia.  Hemoglobin A1c has improved since last visit.    10/19/2017: Presents for diabetic follow-up.  He did have his Jardiance increased to 25 mg daily.  He has noticed some lower morning sugars in the 70s and 80s.  He has scaled back his Lantus dose from 34 units to 24 units at night.  Otherwise feeling fine.  No other specific complaints or concerns.         Review of Systems   Constitutional:  No fever, No chills.    Eye:  Negative except as documented in history of present illness.    Ear/Nose/Mouth/Throat:  Negative except as documented in history of present illness.    Respiratory:  No shortness of breath.    Cardiovascular:  No chest pain, No palpitations, No peripheral edema, No syncope.    Gastrointestinal:  No nausea, No vomiting, No abdominal pain.    Genitourinary:  No dysuria, No hematuria.    Hematology/Lymphatics:  Negative except as documented in history of present illness.    Endocrine:  No excessive thirst, No polyuria.    Immunologic:  No recurrent fevers.    Musculoskeletal:  Joint pain, No muscle pain.    Neurologic:  Alert and oriented X4, No numbness, No tingling, No headache.       Health Status   Allergies:    Allergic Reactions (Selected)  No known allergies   Medications:  (Selected)   Prescriptions  Prescribed  BD PEN NEEDLES UF SHORT 8MM 100'S 31G 5/16: BD PEN NEEDLES UF SHORT 8MM 100'S 31G 5/16, See Instructions, Instructions: USE DAILY, # 100 EA, 3 Refill(s), Type: Maintenance, Pharmacy: [facility]  BD PEN NEEDLES UF SHORT 8MM 100'S 31G 5/16: See Instructions, Instructions: USE DAILY, # 100 unknown unit, 2 Refill(s), Pharmacy: [facility], USE DAILY  BP Pen Henry UF Short 8mm 100's 31G5/16: BP Pen Henry UF Short 8mm 100's 31G5/16, See Instructions, Instructions: use bid,  Supply, # 200 EA, 3 Refill(s), Type: Maintenance, Pharmacy: [facility], use bid  FREESTYLE LITE TEST [id number]: See Instructions, Instructions: USE TO TEST FIVE TO SIX TIMES DAILY, # 500 EA, 2 Refill(s), Type: Soft Stop, Pharmacy: [facility], USE TO TEST FIVE TO SIX TIMES DAILY  Freestyle Lite test strips: Freestyle Lite test strips, See Instructions, Instructions: TESTING 5-6x/day, Supply, # 500 EA, 3 Refill(s), Pharmacy: [facility], TESTING 5-6x/day  Freestyle insulinx test strips: Freestyle insulinx test strips, See Instructions, Instructions: testing 2x/ day, Supply, # 200 EA, 3 Refill(s), Type: Maintenance, Pharmacy: [facility], testing 2x/ day  Jardiance 25 mg oral tablet: 1 tab(s) ( 25 mg ), po, qam, # 90 tab(s), 3 Refill(s), Type: Maintenance, Pharmacy: [facility], 1 tab(s) po qam  Lantus Solostar Pen 100 units/mL subcutaneous solution: See Instructions, Instructions: INJECT 34 UNITS UNDER THE SKIN AT BEDTIME, # 45 mL, 3 Refill(s), Type: Maintenance, Pharmacy: [facility], INJECT 34 UNITS UNDER THE SKIN AT BEDTIME  Lipitor 20 mg oral tablet: 1 tab(s) ( 20 mg ), PO, Daily, # 90 tab(s), 3 Refill(s), Type: Maintenance, Pharmacy: [facility], 1 tab(s) po daily  Misc Rx Supply: BD UF short 5/16 pen needles, See Instructions, Instructions: uses BID, Supply, # 200 EA, 3 Refill(s), Type: Maintenance, Pharmacy: [facility], in place of previious rx-keep on hold and pt will notify when needed, uses BID  Tanzeum 30 mg subcutaneous injection: ( 30 mg ), subcutaneous, qweek, # 12 EA, 3 Refill(s), Type: Maintenance, Pharmacy: [facility], 30 mg subcutaneous qweek  Viagra 100 mg oral tablet: 1 tab(s) ( 100 mg ), PO, Daily, Instructions: KEEP ON HOLD AND PT WILL NOTIFY WHEN NEEDED, # 30 tab(s), 11 Refill(s), Type: Maintenance, Pharmacy: [facility], 1 tab(s) po daily,Instr:KEEP ON HOLD AND PT WILL NOTIFY WHEN NEEDED  aspirin 325 mg oral tablet: 1 tab(s) ( 325 mg ), PO, Daily, # 30 tab(s), 0 Refill(s), Type: Maintenance, OTC (Rx)  atenolol 25 mg oral tablet: 1 tab(s) ( 25 mg ), po, daily, # 90 tab(s), 3 Refill(s), Type: Maintenance, Pharmacy: [facility], 1 tab(s) po daily  hydrochlorothiazide-lisinopril 12.5 mg-10 mg oral tablet: 1 tab(s), PO, Daily, # 90 tab(s), 3 Refill(s), Type: Maintenance, Pharmacy: [facility], 1 tab(s) po daily  metFORMIN 1000 mg oral tablet: 1 tab(s) ( 1,000 mg ), po, bid, # 180 tab(s), 3 Refill(s), Type: Maintenance, Pharmacy: [facility], 1 tab(s) po bid  Documented Medications  Documented  Eucerin topical cream: 1 gurdeep, TOP, BID, 0  Multiple Vitamins oral capsule: 1 cap(s), PO, Daily, 0 Refill(s), Type: Soft Stop   Problem list:    All Problems  Coronary artery disease / ICD-9-.0 / Confirmed  Diabetes Mellitus, Type 2 / ICD-9-.00 / Confirmed  Hyperlipidemia / SNOMED CT 85541292 / Confirmed  Hypertension / SNOMED CT 5419249430 / Confirmed  Impotence / ICD-9-.84 / Confirmed  Obesity / ICD-9-.00 / Probable  Inactive: Diverticulosis / ICD-9-.10  Canceled: Obese / ICD-9-.00      Histories   Past Medical History:    Active  Coronary artery disease (414.0)   Family History:    Melanoma  Sister (Vilma)  Diabetes mellitus  Sister (Vilma)  Hypertension  Sister (Vilma)  Brother (Michael)  Brother (Bruno)  Mother ()  Diabetes mellitus type 2  Mother ()  Father ()  Myocardial infarction  Father ()  Carotid endarterectomy  Mother ()  Bladder cancer  Father ()     Procedure history:    Colonoscopy (662914035) on 2010 at 60 Years.  Comments:  2/15/2011 12:47 PM - Svitlana Abdalla CMA  Repeat in 10 yrs    2010 10:23 AM - Juliet Rasmussen  Post-op diagnosis:  diverticulosis of colon.  Dilated retinal  eye exam with interpretation by an ophthalmologist or optometrist documented and reviewed (DM) (2022F) on 2/26/2010 at 59 Years.  Flexible sigmoidoscopy (79704616) on 11/11/2007 at 57 Years.  Flexible sigmoidoscopy (77036211) on 11/11/2004 at 54 Years.  CABG - Coronary artery bypass graft ([id number]) on 11/13/2000 at 50 Years.  Comments:  5/14/2010 12:42 PM - Maranda Love  Three Vessel.  Arthroscopy of knee ([id number]) in 1993 at 43 Years.  Comments:  5/14/2010 12:41 PM - Maranda Love  Right.  Inguinal herniorrhaphy ([id number]) in 1966 at 16 Years.  Comments:  9/27/2013 9:54 AM - Destiney Bravo  Right  Inguinal herniorrhaphy ([id number]) in 1955 at 5 Years.  Comments:  9/27/2013 9:54 AM - Destiney Bravo  Left   Social History:        Alcohol Assessment: Current            Current, Wine (5 oz), 1-2 times per week      Tobacco Assessment: Past            Past, Stopped age 50 Years.      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        Physical Examination   vital signs stable, as noted above   Vital Signs   10/19/2017 4:35 PM CDT Temperature Tympanic 97.4 DegF  LOW    Peripheral Pulse Rate 60 bpm    Systolic Blood Pressure 138 mmHg    Diastolic Blood Pressure 60 mmHg    Mean Arterial Pressure 86 mmHg    BP Site Right arm      Measurements from flowsheet : Measurements   10/19/2017 4:35 PM CDT   Weight Measured - Standard                184 lb     General:  Alert and oriented, No acute distress.    Eye:  Extraocular movements are intact.    HENT:  Normocephalic, Tympanic membranes are clear, Oral mucosa is moist, No pharyngeal erythema, upper dentures.    Neck:  Supple, No jugular venous distention, No lymphadenopathy.    Respiratory:  Lungs are clear to auscultation, Respirations are non-labored.    Cardiovascular:  Normal rate, No murmur, No edema.    Gastrointestinal:  Soft,  Non-distended, Normal bowel sounds, No organomegaly.    Musculoskeletal:  Normal range of motion, Normal strength, No tenderness.    Integumentary:  dry, crackles soles of feet - no intertrigo, single actinic keratosis on right cheek.    Neurologic:  Alert, Oriented, Normal motor function, No focal deficits.    Cognition and Speech:  Oriented, Speech clear and coherent.    Psychiatric:  Appropriate mood & affect.       Review / Management   Results review:  Lab results   10/19/2017 5:14 PM CDT Hgb A1c 7.5  HI   9/27/2017 7:43 AM CDT Sodium Level 134 mmol/L  LOW    Potassium Level 4.2 mmol/L    Chloride Level 100 mmol/L    CO2 Level 24 mmol/L    Glucose Level 143 mg/dL  HI    BUN 23 mg/dL    Creatinine 0.94 mg/dL    BUN/Creat Ratio NOT APPLICABLE    eGFR 82 mL/min/1.73m2    eGFR African American 95 mL/min/1.73m2    Calcium Level 9.9 mg/dL   .       Impression and Plan   Diagnosis     Hypertension (BFV88-CC I10).     Hyperlipidemia (NEJ98-EA E78.5).     Diabetes Mellitus, Type 2 (CYA80-RL E11.9).     Coronary artery disease (VAB24-TI I25.10).     AK (actinic keratosis) (DIH09-NJ L57.0).       .) DM, controlled  hypoglycemic medications: metformin 1g BID, tanzeum 30mg qweek, empagliflozin 25mg daily  insulin therapy: Lantus 24 units qhs   - other alterative would be introduction of prandial insulin  last HbA1c: 7.5%  microvascular complications: none  macrovascular complications: CAD  ASA/JOSÉ MIGUEL/statin:  ASA 325mg daily, lisinopril 10mg, atorvastatin 20mg QHS    .) HTN, controlled  current antihypertensive regimen: atenolol 25mg daily, lisinopril/hctz 10/12.5mg daily  regimen changes: none  intolerance:  future titration/work-up plan:     .) CAD, h/o CABG in '00   - on atorvastatin, ASA, atenolol    .) Health maintenance   - CRC screening due in 2020   - PSA 0.4;  plans to monitor q2yrs (recheck at next visit)   - AAA screen normal ('15)   - now retired    RTC in 6 months         Professional Services   Counseling  Summary:  This was a 25 minute visit with greater than 50% of that time spent counseling the patient.

## 2022-02-16 NOTE — CARE COORDINATION
Patient:   CHINO LEWIS            MRN: [id number]            FIN: [id number]               Age:   67 years     Sex:  Male     :  1950   Associated Diagnoses:   None   Author:   Celina Samayoa CMA      Sources of Information:  [ ] Patient, family member, or caregiver (Please list):  [ x] Hospital discharge summary  [ ] Hospital fax  [ ] List of recent hospitalizations or ED visits  [ ] Other:     Discharged From: [facility]  Discharge Date: 18    Diagnosis/Problem: severe sepsis    Medication Changes: [x ] Yes [ ] No   Medication List Updated: [ x] Yes [ ] No    Needs Referral or Lab: [x ] Yes [ ] No   F/u with IR at [facility] in 1 week for elective tunneled pleural catheter. CBC, INR and chest xray to be done prior to procedure (IR wlll do).     Needs Follow-up Appointment:  [x ] Within 7 days of discharge (highly complex visit)  [ ] Within 14 days of discharge (moderately complex visit)    Appointment Made With: MELANI  Date: 18    Spoke to pt-see msg to MELANI from todayappt completed